# Patient Record
Sex: MALE | Race: WHITE | NOT HISPANIC OR LATINO | Employment: OTHER | URBAN - METROPOLITAN AREA
[De-identification: names, ages, dates, MRNs, and addresses within clinical notes are randomized per-mention and may not be internally consistent; named-entity substitution may affect disease eponyms.]

---

## 2022-08-04 ENCOUNTER — HOSPITAL ENCOUNTER (OUTPATIENT)
Dept: RADIOLOGY | Facility: HOSPITAL | Age: 62
Discharge: HOME | End: 2022-08-04
Attending: NURSE PRACTITIONER
Payer: COMMERCIAL

## 2022-08-04 DIAGNOSIS — J44.9 COPD (CHRONIC OBSTRUCTIVE PULMONARY DISEASE): ICD-10-CM

## 2022-08-04 PROCEDURE — 71046 X-RAY EXAM CHEST 2 VIEWS: CPT | Mod: 26,,, | Performed by: RADIOLOGY

## 2022-08-04 PROCEDURE — 71046 XR CHEST PA AND LATERAL: ICD-10-PCS | Mod: 26,,, | Performed by: RADIOLOGY

## 2022-08-04 PROCEDURE — 71046 X-RAY EXAM CHEST 2 VIEWS: CPT | Mod: TC

## 2022-08-27 ENCOUNTER — HOSPITAL ENCOUNTER (EMERGENCY)
Facility: HOSPITAL | Age: 62
Discharge: HOME | End: 2022-08-27
Attending: EMERGENCY MEDICINE
Payer: COMMERCIAL

## 2022-08-27 VITALS
HEIGHT: 69 IN | RESPIRATION RATE: 20 BRPM | SYSTOLIC BLOOD PRESSURE: 142 MMHG | WEIGHT: 175 LBS | TEMPERATURE: 98 F | BODY MASS INDEX: 25.92 KG/M2 | HEART RATE: 87 BPM | DIASTOLIC BLOOD PRESSURE: 107 MMHG | OXYGEN SATURATION: 98 %

## 2022-08-27 DIAGNOSIS — T81.30XA WOUND DEHISCENCE: Primary | ICD-10-CM

## 2022-08-27 PROCEDURE — 25000003 PHARM REV CODE 250: Performed by: EMERGENCY MEDICINE

## 2022-08-27 PROCEDURE — 12002 RPR S/N/AX/GEN/TRNK2.6-7.5CM: CPT

## 2022-08-27 PROCEDURE — 99284 EMERGENCY DEPT VISIT MOD MDM: CPT

## 2022-08-27 RX ORDER — LIDOCAINE HYDROCHLORIDE 10 MG/ML
5 INJECTION, SOLUTION EPIDURAL; INFILTRATION; INTRACAUDAL; PERINEURAL
Status: COMPLETED | OUTPATIENT
Start: 2022-08-27 | End: 2022-08-27

## 2022-08-27 RX ORDER — AMLODIPINE BESYLATE 10 MG/1
TABLET ORAL
COMMUNITY

## 2022-08-27 RX ADMIN — LIDOCAINE HYDROCHLORIDE 50 MG: 10 INJECTION, SOLUTION EPIDURAL; INFILTRATION; INTRACAUDAL; PERINEURAL at 09:08

## 2022-08-27 RX ADMIN — BACITRACIN ZINC, NEOMYCIN, POLYMYXIN B: 400; 3.5; 5 OINTMENT TOPICAL at 10:08

## 2022-08-27 NOTE — DISCHARGE INSTRUCTIONS
Apply antibiotic ointment and a sterile, loose bandage daily.  Follow-up with Dr. Casey on Monday.  Return here as needed or if worse in any way.

## 2022-08-27 NOTE — ED PROVIDER NOTES
Encounter Date: 8/27/2022       History     Chief Complaint   Patient presents with    Wound Check     Had surgery on the L wrist 10 days ago for tendonitis. States that he had sutures removed 3 days ago and that the wound has dehisced.      61-year-old male here for evaluation and treatment of surgical wound dehiscence.  Patient states that 10 days ago he had left wrist surgery by an orthopedist in [place].  Sutures were removed 3 days ago, and last night he noticed that 1 portion of the surgical incision had opened.  Denies any pain or other complications.  Does admit to some continued edema of the region since the surgery.  No fever or chills.  No purulent drainage.  Patient has not tried to contact the surgeon about this problem.    Review of patient's allergies indicates:   Allergen Reactions    Animal dander     Penicillins     Pollen extracts      Past Medical History:   Diagnosis Date    Anemia     Anxiety     Asthma     Cervical spondylosis 10/14/2016    Chronic back pain     COPD (chronic obstructive pulmonary disease)     Depression     GERD (gastroesophageal reflux disease)     History of prosthetic unicompartmental arthroplasty of right knee 02/24/2020    Hypertension 08/15/2016     Past Surgical History:   Procedure Laterality Date    ARTHROSCOPIC REPAIR OF ROTATOR CUFF OF SHOULDER Right 08/01/2019    COLONOSCOPY      HERNIA REPAIR      LUMBAR FUSION      REPAIR OF TRICEPS TENDON Right     3 surgeries    REPAIR OF TRICEPS TENDON Left     x1    ROTATOR CUFF REPAIR Bilateral     2 on right , 1 to left    UNICOMPARTMENTAL ARTHROPLASTY OF KNEE Right      Family History   Problem Relation Age of Onset    Hypertension Mother     Cancer Mother     Cancer Father     Hypertension Father      Social History     Tobacco Use    Smoking status: Never    Smokeless tobacco: Never   Substance Use Topics    Alcohol use: Not Currently    Drug use: Not Currently     Review of Systems   Constitutional: Negative.     HENT: Negative.     Eyes: Negative.    Respiratory: Negative.     Cardiovascular: Negative.    Gastrointestinal: Negative.    Endocrine: Negative.    Genitourinary: Negative.    Musculoskeletal:  Positive for arthralgias (Continued generalized left wrist soreness).   Skin:  Positive for wound.   Allergic/Immunologic: Negative.    Neurological: Negative.    Psychiatric/Behavioral: Negative.       Physical Exam     Initial Vitals [08/27/22 0927]   BP Pulse Resp Temp SpO2   (!) 142/107 87 20 98.4 °F (36.9 °C) 98 %      MAP       --         Physical Exam    Nursing note and vitals reviewed.  Constitutional: He appears well-developed and well-nourished. He is not diaphoretic. No distress.   HENT:   Head: Normocephalic and atraumatic.   Nose: Nose normal.   Mouth/Throat: Oropharynx is clear and moist.   Eyes: Conjunctivae and EOM are normal. Pupils are equal, round, and reactive to light. No scleral icterus.   Neck: Neck supple. No JVD present.   Normal range of motion.  Cardiovascular:  Normal rate, regular rhythm, normal heart sounds and intact distal pulses.           No murmur heard.  Pulmonary/Chest: Breath sounds normal. No stridor. No respiratory distress. He has no wheezes.   Abdominal: Abdomen is soft. Bowel sounds are normal. He exhibits no distension. There is no abdominal tenderness.   Musculoskeletal:         General: Tenderness and edema present. Normal range of motion.      Cervical back: Normal range of motion and neck supple.      Comments: On the dorsum of the left wrist/distal forearm, there is a Z-plasty scar, consistent with recent surgery.  The most proximal leg of the Z has dehisced.  Wound margins are clean.  No evidence of infection.  No underlying structures are seen or exposed.  The remainder of the surgical scar appears to be healing well and is intact.  There is generalized edema of the distal forearm/wrist/dorsum of the hand.  Normal sensory and motor function in the hand, range of motion  slightly decreased secondary to edema in discomfort, capillary refill normal.     Neurological: He is alert and oriented to person, place, and time. He has normal strength. No cranial nerve deficit or sensory deficit. GCS score is 15. GCS eye subscore is 4. GCS verbal subscore is 5. GCS motor subscore is 6.   Skin: Skin is warm and dry. Capillary refill takes less than 2 seconds. No rash noted. No erythema.   Psychiatric: He has a normal mood and affect. His behavior is normal.       ED Course   Lac Repair    Date/Time: 8/27/2022 10:33 AM  Performed by: Edvin Nova MD  Authorized by: Edvin Nova MD     Consent:     Consent obtained:  Verbal    Consent given by:  Patient    Risks, benefits, and alternatives were discussed: yes      Risks discussed:  Infection, need for additional repair, nerve damage, poor wound healing, poor cosmetic result, pain, retained foreign body, tendon damage and vascular damage    Alternatives discussed:  No treatment, referral and delayed treatment  Universal protocol:     Procedure explained and questions answered to patient or proxy's satisfaction: yes      Relevant documents present and verified: yes      Test results available: yes      Imaging studies available: yes      Required blood products, implants, devices, and special equipment available: yes      Site/side marked: yes      Immediately prior to procedure, a time out was called: yes      Patient identity confirmed:  Verbally with patient and arm band  Anesthesia:     Anesthesia method:  Local infiltration    Local anesthetic:  Lidocaine 1% w/o epi  Laceration details:     Location:  Shoulder/arm    Shoulder/arm location:  L lower arm    Length (cm):  3    Depth (mm):  3  Pre-procedure details:     Preparation:  Patient was prepped and draped in usual sterile fashion  Exploration:     Limited defect created (wound extended): no      Hemostasis achieved with:  Direct pressure    Imaging outcome: foreign body not noted       Wound exploration: wound explored through full range of motion and entire depth of wound visualized      Wound extent: no areolar tissue violation noted, no fascia violation noted, no foreign bodies/material noted, no muscle damage noted, no nerve damage noted, no tendon damage noted, no underlying fracture noted and no vascular damage noted      Contaminated: no    Treatment:     Area cleansed with:  Povidone-iodine and saline    Amount of cleaning:  Standard    Irrigation solution:  Sterile saline    Irrigation volume:  50    Irrigation method:  Syringe    Foreign body removal: none seen.      Debridement:  None    Undermining:  None    Scar revision: no    Skin repair:     Repair method:  Sutures and Steri-Strips    Suture size:  4-0    Suture material:  Nylon    Suture technique:  Simple interrupted    Number of sutures:  6  Approximation:     Approximation:  Close  Repair type:     Repair type:  Simple  Post-procedure details:     Dressing:  Antibiotic ointment    Procedure completion:  Tolerated well, no immediate complications  Labs Reviewed - No data to display       Imaging Results    None          Medications   LIDOcaine (PF) 10 mg/ml (1%) injection 50 mg (50 mg Infiltration Given by Provider 8/27/22 8214)   neomycin-bacitracin-polymyxin ointment ( Topical (Top) Given 8/27/22 6608)     Medical Decision Making:   Differential Diagnosis:   Wound dehiscence, infection, etc.  ED Management:  After discussing options with the patient, it was decided that the wound would be closed with sutures.  There is no evidence of infectious process and the wound is very clean.  Sutures were used to close the dehisced portion of the wound, and Steri-Strips were placed over the remaining portion of the surgical incision to prevent any further dehiscence.  Patient was given wound care instructions and he will follow-up with his surgeon on Monday morning.  Return here for any worsening signs or symptoms.                     Clinical Impression:   Final diagnoses:  [T81.30XA] Wound dehiscence (Primary)      ED Disposition Condition    Discharge Stable          ED Prescriptions    None       Follow-up Information       Follow up With Specialties Details Why Contact Info    Temo Casey MD Orthopedic Surgery In 2 days  [address] [facility] [address]  [phone]      [facility] Emergency Dept Emergency Medicine  As needed, If symptoms worsen [address] [address] [phone]             Edvin Nova MD  08/27/22 1039

## 2024-06-17 ENCOUNTER — TELEPHONE (OUTPATIENT)
Dept: NEUROSURGERY | Facility: CLINIC | Age: 64
End: 2024-06-17
Payer: COMMERCIAL

## 2024-06-17 NOTE — TELEPHONE ENCOUNTER
LVM to return call to clinic for scheduling.  Has current imaging uploaded to chart.  Will need a copy of dictated reports.      ----- Message from Tuan Cazares sent at 6/17/2024 10:52 AM CDT -----  Regarding: Sooner Appt  Type:  Sooner Appointment Request    Caller is requesting a sooner appointment.  Caller declined first available appointment listed below.  Caller will not accept being placed on the waitlist and is requesting a message be sent to doctor.    Name of Caller:Pt    When is the first available appointment?bookit didn't list symptoms    Symptoms:     Would the patient rather a call back or a response via [facility]? Call back    Best Call Back Number:[phone]      Additional Information: Sts he already dropped off films and were uploaded needs to get appt booked.   Please advise -- Thank you

## 2024-06-18 ENCOUNTER — TELEPHONE (OUTPATIENT)
Dept: NEUROSURGERY | Facility: CLINIC | Age: 64
End: 2024-06-18
Payer: COMMERCIAL

## 2024-06-18 NOTE — TELEPHONE ENCOUNTER
Returned patient's phone call.  Patient successfully scheduled with Dr. Troncoso 07/10/24.  Patient will obtain dictated reports and have them faxed to our clinic.  Advised to contact clinic with any questions/concerns.      ----- Message from Paulina Wilhelm Patient Care Assistant sent at 6/18/2024  8:27 AM CDT -----  Contact: Pt  Type: Return Call    Who Called: Pt  Who Left Message for Pt: Patricia  Does the patient know what this is regarding: Yes  Best Call Back Number: [phone]  Thank you~

## 2024-07-10 ENCOUNTER — PATIENT MESSAGE (OUTPATIENT)
Dept: NEUROSURGERY | Facility: CLINIC | Age: 64
End: 2024-07-10
Payer: COMMERCIAL

## 2024-07-10 ENCOUNTER — OFFICE VISIT (OUTPATIENT)
Dept: NEUROSURGERY | Facility: CLINIC | Age: 64
End: 2024-07-10
Payer: COMMERCIAL

## 2024-07-10 ENCOUNTER — TELEPHONE (OUTPATIENT)
Dept: NEUROSURGERY | Facility: CLINIC | Age: 64
End: 2024-07-10
Payer: COMMERCIAL

## 2024-07-10 VITALS
SYSTOLIC BLOOD PRESSURE: 121 MMHG | DIASTOLIC BLOOD PRESSURE: 77 MMHG | BODY MASS INDEX: 25.93 KG/M2 | HEART RATE: 70 BPM | HEIGHT: 69 IN | RESPIRATION RATE: 18 BRPM | WEIGHT: 175.06 LBS

## 2024-07-10 DIAGNOSIS — M54.9 DORSALGIA, UNSPECIFIED: Primary | ICD-10-CM

## 2024-07-10 PROCEDURE — 99205 OFFICE O/P NEW HI 60 MIN: CPT | Mod: S$GLB,,, | Performed by: STUDENT IN AN ORGANIZED HEALTH CARE EDUCATION/TRAINING PROGRAM

## 2024-07-10 RX ORDER — CHLORTHALIDONE 25 MG/1
1 TABLET ORAL EVERY MORNING
COMMUNITY
Start: 2023-08-29

## 2024-07-10 NOTE — PROGRESS NOTES
[facility] Neurosurgery - [place]  Clinic Consult     Consult Requested By: No, Primary Doctor, Self, Aaareferral        SUBJECTIVE:     Chief Complaint:   Chief Complaint   Patient presents with    Back Pain     Patient present to clinic with c/o intermittent back pain, worse in the last month.  Reports BL foot drop. Reports balance issues. Denies any incontinence.        History of Present Illness:  Orlin Mayo is a 63 y.o. male who presents with a very complicated history he has had multiple spine surgeries including a remote ACDF with a adjacent segment disease slight kyphosis and a spondylolisthesis with moderate stenosis, he has no cord compression or cord signal change, he denies any new upper extremity symptoms.  Functionally stable.  He will get some intermittent paresthesias in the right hand but this is chronic nothing new.    With his regard to his lumbar spine in [place] in April he had a revision surgery and which sounds like an extension of fusion proximally and distally which would now be a fusion from L2-S1 this included an jail at L5-S1.  He states that he had the surgery for back but right leg pain and paresthesias involving the right foot.  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  He really does not feel like the surgery improved his symptoms.  But no worse  I have no postoperative imaging.    I he did have known proximal mild moderate scoliosis, as well as extensive thoracic degeneration with stenosis what appears to be some myelomalacia.  He states this was known preoperatively he saw 2 surgical opinions had surgery in [place] proceeded with the lumbar surgery.  He has no rapid deterioration but he does have chronic thoracic myelopathy with some imbalance he does feel like this limits his walking and coordination    And lastly 3 weeks ago he underwent a knee replacement with Dr. Acosta which he is actively recovering with no complications, and with  physical therapy.          Pertinent and Recent history, provider evaluations, imaging and data reviewed in EPIC             Diagnostic Results:  I have independently reviewed the following imaging:    Reviewed a preoperative lumbar MRI from January which showed a slight low-grade spondylolisthesis at L5-S1 previous fusion from 3-5 and adjacent segment disease with ligamentum buckling and stenosis at L2-3.  Proximal to this a mild moderate including rotational scoliosis    He has a thoracic MRI from March of 2024  Extensive multilevel disc degeneration disc osteophyte complex in the lower segment from T9-T12 multifocal stenosis of the lowest segment with myelomalacia  Chronic appearing    With regard to his cervical spine see 4-6 ACDF disc osteophyte complex with moderate stenosis and slight kyphosis above without cord compression or cord signal change, and at the cervicothoracic junction a slight spondylolisthesis ligamentum buckling circumferential stenosis without cord deformation or cord signal change            Review of patient's allergies indicates:   Allergen Reactions    Animal dander     Penicillins     Pollen extracts        Past Medical History:   Diagnosis Date    Anemia     Anxiety     Asthma     Cervical spondylosis 10/14/2016    Chronic back pain     COPD (chronic obstructive pulmonary disease)     Depression     GERD (gastroesophageal reflux disease)     History of prosthetic unicompartmental arthroplasty of right knee 02/24/2020    Hypertension 08/15/2016     Past Surgical History:   Procedure Laterality Date    ARTHROSCOPIC REPAIR OF ROTATOR CUFF OF SHOULDER Right 08/01/2019    COLONOSCOPY      HERNIA REPAIR      LUMBAR FUSION      REPAIR OF TRICEPS TENDON Right     3 surgeries    REPAIR OF TRICEPS TENDON Left     x1    ROTATOR CUFF REPAIR Bilateral     2 on right , 1 to left    UNICOMPARTMENTAL ARTHROPLASTY OF KNEE Right      Family History   Problem Relation Name Age of Onset    Hypertension  Mother      Cancer Mother      Cancer Father      Hypertension Father       Social History     Tobacco Use    Smoking status: Never    Smokeless tobacco: Never   Substance Use Topics    Alcohol use: Not Currently    Drug use: Not Currently        Review of Systems:      Constitutional: no fever, chills or night sweats. No abrupt changes in weight         OBJECTIVE:     Vital Signs (Most Recent):  Pulse: 70 (07/10/24 1004)  Resp: 18 (07/10/24 1004)  BP: 121/77 (07/10/24 1004)    Physical Exam:      General: well developed, well nourished, no distress. .  Mental Status: Awake, Alert, Oriented x 4  Language: No aphasia  Speech: No dysarthria  Head: normocephalic, atraumatic.  Neck: trachea midline, no JVD   Cardiovascular: no LE edema  Pulmonary: normal respirations, no signs of respiratory distress  Abdomen: soft, non-distended    Motor Strength:  No abnormal movements seen.     Strength  Deltoids Triceps Biceps Wrist Extension Wrist Flexion Hand  Interossei     Upper: R 5/5 5/5 5/5 5/5 5/5 5/5 5/5      L 5/5 5/5 5/5 5/5 5/5 5/5 5/5       Iliopsoas Quadriceps  Knee replacement Knee  Flexion Tibialis  anterior Gastro- cnemius EHL  Foot Eversion Foot inversion   Lower: R 5/5 4/5 5/5 3-/5 5/5 5/5 5/5 5/5 4/5    L 5/5 5/5 5/5 2/5 5/5 5/5 5/5 5/5 2/5     SILT,PP dec rigtht LE to foot diffuse      DTR's: 3 + LE  Johnson: absent  Clonus: absent    Gait: independent with cane assist ( currently recovering from knee replacement   Tandem Gait: No difficulty           Able to walk on heels & toes             ASSESSMENT/PLAN:     Dorsalgia, unspecified  -     MRI Lumbar Spine Without Contrast; Future; Expected date: 07/10/2024  -     X-Ray Lumbar Spine Ap Lateral w/Flex Ext; Future; Expected date: 07/10/2024  -     X-Ray Scoliosis Complete; Future; Expected date: 07/10/2024      63-year-old gentleman complicated history in summary  He has a remote ACDF, with slight proximal kyphosis proximal and distal adjacent segment  disease with moderate stenosis no cord signal change and no progressive myeloradiculopathy.  We will continue to monitor this he was well educated    2. He underwent a proximal and distal revision spine surgery in [place] in April for what he states is primarily right lower extremity pain and paresthesias which did not improve.  I do not have any postoperative imaging preoperatively he had adjacent segment disease and stenosis at L2-3 he states he underwent a TLIF at L5-S1 and potentially at L2-3 as he states he does not recall posterior surgery at the time.  This is complicated with thoracic stenosis, chronic thoracic myelopathy without progression which was known preoperatively with a thoracic MRI.  Reviewed the complexity of this situation.  Overall he is stable and an added factors that he underwent a knee replacement 3 weeks ago from which he is actively recovering.    I have recommended to recover from his knee we will obtain an MRI of his lumbar spine to evaluate his postoperative features as well as a scoliosis x-ray  I do not think there is any urgency but with regard to progressive thoracic myelopathy a complicated cyst situation with scoliosis and multiple previous lumbar fusions  With regard to his long-term management and surgical treatment options I have recommended obtaining a new imaging.  I am going to refer him to Dr. Carlton  with complex spine for opinion                              Stuart Troncoso MD  Neurosurgery

## 2024-07-11 ENCOUNTER — TELEPHONE (OUTPATIENT)
Dept: NEUROSURGERY | Facility: CLINIC | Age: 64
End: 2024-07-11
Payer: COMMERCIAL

## 2024-07-11 NOTE — TELEPHONE ENCOUNTER
P/c to pt. Gave him all the info on below appts:    Future Appointments   Date Time Provider Department Center   7/17/2024  1:30 PM [facility] OPP MRI1 [facility] OPP MRI [facility] - [facility]PP   7/17/2024  2:30 PM [facility] OPP XR2 [facility] OP XRAY [facility] - [facility]   8/22/2024  9:30 AM [facility] OIC-CT1 500 LB LIMIT [facility]   8/22/2024  9:45 AM [facility] OIC-XRAY [facility] XRAY [facility]   8/22/2024 10:15 AM [facility]IC-XRAY [facility]   8/22/2024 11:00 AM Dawit Carlton MD [facility] NEUROS8 [facility]     Pt. Thanked me for notifying him.

## 2024-07-11 NOTE — TELEPHONE ENCOUNTER
Pc earlier this am regarding the below appts. Pt. Thanked me for calling    Future Appointments   Date Time Provider Department Center   7/17/2024  1:30 PM [facility] OPP MRI1 [facility] OPP MRI [facility] - OPP   7/17/2024  2:30 PM [facility] OPP XR2 [facility] OP XRAY [facility] - OPP   8/22/2024  9:30 AM [facility] OIC-CT1 500 LB LIMIT [facility]   8/22/2024  9:45 AM [facility]XRAY [facility]   8/22/2024 10:15 AM [facility]XRAY [facility]   8/22/2024 11:00 AM Dawit Carlton MD [facility] NEUROS8 Jose chantel

## 2024-07-18 ENCOUNTER — PATIENT MESSAGE (OUTPATIENT)
Dept: NEUROSURGERY | Facility: CLINIC | Age: 64
End: 2024-07-18
Payer: COMMERCIAL

## 2024-08-05 ENCOUNTER — OFFICE VISIT (OUTPATIENT)
Dept: PODIATRY | Facility: CLINIC | Age: 64
End: 2024-08-05
Payer: COMMERCIAL

## 2024-08-05 VITALS — BODY MASS INDEX: 25.93 KG/M2 | HEIGHT: 69 IN | WEIGHT: 175.06 LBS

## 2024-08-05 DIAGNOSIS — M79.2 NERVE PAIN: ICD-10-CM

## 2024-08-05 DIAGNOSIS — M77.41 METATARSALGIA OF BOTH FEET: Primary | ICD-10-CM

## 2024-08-05 DIAGNOSIS — M77.42 METATARSALGIA OF BOTH FEET: Primary | ICD-10-CM

## 2024-08-05 PROCEDURE — 99999 PR PBB SHADOW E&M-EST. PATIENT-LVL III: CPT | Mod: PBBFAC,,, | Performed by: STUDENT IN AN ORGANIZED HEALTH CARE EDUCATION/TRAINING PROGRAM

## 2024-08-05 PROCEDURE — 99204 OFFICE O/P NEW MOD 45 MIN: CPT | Mod: S$GLB,,, | Performed by: STUDENT IN AN ORGANIZED HEALTH CARE EDUCATION/TRAINING PROGRAM

## 2024-08-05 RX ORDER — LIDOCAINE AND PRILOCAINE 25; 25 MG/G; MG/G
CREAM TOPICAL
Qty: 60 G | Refills: 11 | Status: SHIPPED | OUTPATIENT
Start: 2024-08-05 | End: 2025-08-05

## 2024-08-05 RX ORDER — ALCOHOL 2.38 KG/3.79L
1 GEL TOPICAL DAILY
Qty: 30 CAPSULE | Refills: 11 | Status: SHIPPED | OUTPATIENT
Start: 2024-08-05 | End: 2025-08-05

## 2024-08-09 ENCOUNTER — TELEPHONE (OUTPATIENT)
Dept: NEUROSURGERY | Facility: CLINIC | Age: 64
End: 2024-08-09
Payer: COMMERCIAL

## 2024-08-20 ENCOUNTER — HOSPITAL ENCOUNTER (OUTPATIENT)
Dept: RADIOLOGY | Facility: HOSPITAL | Age: 64
Discharge: HOME | End: 2024-08-20
Attending: NEUROLOGICAL SURGERY
Payer: COMMERCIAL

## 2024-08-20 ENCOUNTER — OFFICE VISIT (OUTPATIENT)
Dept: NEUROSURGERY | Facility: CLINIC | Age: 64
End: 2024-08-20
Payer: COMMERCIAL

## 2024-08-20 VITALS — SYSTOLIC BLOOD PRESSURE: 117 MMHG | DIASTOLIC BLOOD PRESSURE: 76 MMHG | HEART RATE: 90 BPM

## 2024-08-20 DIAGNOSIS — M54.9 DORSALGIA, UNSPECIFIED: ICD-10-CM

## 2024-08-20 DIAGNOSIS — M47.14 THORACIC MYELOPATHY: Primary | ICD-10-CM

## 2024-08-20 PROCEDURE — 72125 CT NECK SPINE W/O DYE: CPT | Mod: 26,,, | Performed by: RADIOLOGY

## 2024-08-20 PROCEDURE — 72125 CT NECK SPINE W/O DYE: CPT | Mod: TC

## 2024-08-20 PROCEDURE — 99999 PR PBB SHADOW E&M-EST. PATIENT-LVL III: CPT | Mod: PBBFAC,,, | Performed by: NEUROLOGICAL SURGERY

## 2024-08-20 PROCEDURE — 99215 OFFICE O/P EST HI 40 MIN: CPT | Mod: S$GLB,,, | Performed by: NEUROLOGICAL SURGERY

## 2024-08-20 PROCEDURE — 72082 X-RAY EXAM ENTIRE SPI 2/3 VW: CPT | Mod: TC

## 2024-08-20 NOTE — PROGRESS NOTES
Dear Stuart Troncoso MD,    Thank you for referring this patient to my clinic. The full details of my evaluation will also be forthcoming to you by letter.    CHIEF COMPLAINT:  Low back pain    I, Candice Woods, attest that this documentation has been prepared under the direction and in the presence of Dawit Carlton MD.    HPI from 7/10/2024 (Dr. Staurt Troncoso):  Orlin Mayo is a 63 y.o. male who presents with a very complicated history he has had multiple spine surgeries including a remote ACDF with a adjacent segment disease slight kyphosis and a spondylolisthesis with moderate stenosis, he has no cord compression or cord signal change, he denies any new upper extremity symptoms.  Functionally stable.  He will get some intermittent paresthesias in the right hand but this is chronic nothing new.     With his regard to his lumbar spine in [place] in April he had a revision surgery and which sounds like an extension of fusion proximally and distally which would now be a fusion from L2-S1 this included an intermediate at L5-S1.  He states that he had the surgery for back but right leg pain and paresthesias involving the right foot.  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  He really does not feel like the surgery improved his symptoms.  But no worse  I have no postoperative imaging.     I he did have known proximal mild moderate scoliosis, as well as extensive thoracic degeneration with stenosis what appears to be some myelomalacia.  He states this was known preoperatively he saw 2 surgical opinions had surgery in [place] proceeded with the lumbar surgery.  He has no rapid deterioration but he does have chronic thoracic myelopathy with some imbalance he does feel like this limits his walking and coordination     And lastly 3 weeks ago he underwent a knee replacement with Dr. Acosta which he is actively recovering with no complications, and with physical therapy.    Interval Hx:   Today the patient reports  that his main concern is low back pain and burning and paraesthesias in his feet, with the right side worse than left. He states that the back pain gets worse as the day goes on. Until recently, his foot symptoms were more concerning but now his lower back pain seems to be present more frequently. He says he is having increased balance issues. He states that he has increased urinary frequency over the past few months. He also reports dropping things with his hands and some occasional numbness in his right hand. He has a back brace that he wears occasionally.     Patient denies any other complaints at this time.    Review of patient's allergies indicates:   Allergen Reactions    Animal dander     Penicillins     Pollen extracts        Past Medical History:   Diagnosis Date    Anemia     Anxiety     Asthma     Cervical spondylosis 10/14/2016    Chronic back pain     COPD (chronic obstructive pulmonary disease)     Depression     GERD (gastroesophageal reflux disease)     History of prosthetic unicompartmental arthroplasty of right knee 02/24/2020    Hypertension 08/15/2016     Past Surgical History:   Procedure Laterality Date    ARTHROSCOPIC REPAIR OF ROTATOR CUFF OF SHOULDER Right 08/01/2019    COLONOSCOPY      HERNIA REPAIR      LUMBAR FUSION      REPAIR OF TRICEPS TENDON Right     3 surgeries    REPAIR OF TRICEPS TENDON Left     x1    ROTATOR CUFF REPAIR Bilateral     2 on right , 1 to left    UNICOMPARTMENTAL ARTHROPLASTY OF KNEE Right      Family History   Problem Relation Name Age of Onset    Hypertension Mother      Cancer Mother      Cancer Father      Hypertension Father       Social History     Tobacco Use    Smoking status: Never    Smokeless tobacco: Never   Substance Use Topics    Alcohol use: Not Currently    Drug use: Not Currently        Review of Systems   Genitourinary:  Positive for frequency.   Musculoskeletal:  Positive for back pain and gait problem.   Neurological:  Positive for numbness.         Paraesthesias. Balance issues.   All other systems reviewed and are negative.      OBJECTIVE:   Vital Signs:       Physical Exam:    Vital signs: All nursing notes and vital signs reviewed -- afebrile, vital signs stable.  Constitutional: Patient sitting comfortably in chair. Appears well developed and well nourished.  Skin: Exposed areas are intact without abnormal markings, rashes or other lesions. Well-healed abdominal incision and two well-healed paraspinal incisions.   HEENT: Normocephalic. Normal conjunctivae.  Cardiovascular: Normal rate and regular rhythm.  Respiratory: Chest wall rises and falls symmetrically, without signs of respiratory distress.  Abdomen: Soft and non-tender.  Extremities: Warm and without edema. Calves supple, non-tender.  Psych/Behavior: Normal affect.    Neurological:    Mental status: Alert and oriented. Conversational and appropriate.       Cranial Nerves: VFF to confrontation. PERRL. EOMI without nystagmus. Facial STLT normal and symmetric. Strong, symmetric muscles of mastication. Facial strength full and symmetric. Hearing equal bilaterally to finger rub. Palate and uvula rise and fall normally in midline. Shoulder shrug 5/5 strength. Tongue midline.     Motor:    Upper:  Deltoids Triceps Biceps WE WF     R 5/5 5/5 5/5 5/5 5/5 5/5    L 5/5 5/5 5/5 5/5 5/5 5/5      Lower:  HF KE KF DF PF EHL    R 5/5 5/5 5/5 3/5 3/5 3/5    L 5/5 5/5 5/5 2/5 3/5 3/5     Bilateral quadriceps atrophy, right more than left.     Sensory: Intact sensation to light touch in all extremities. Romberg positive.    Reflexes:          DTR: 2+ symmetrically throughout.     Johnson's: Negative.     Babinski's: Negative.     Clonus: Negative.     2+ left knee reflex. Had recent knee replacement on right side.     Cerebellar: Finger-to-nose and rapid alternating movements normal. Spastic gait.     Spine:    Posture: Head well aligned over pelvis in front and side views.  No focal or global spinal deformity  visible on inspection. Shoulders and hips even. No obvious leg length discrepancy. No scapula winging.    Bending: Full ROM with forward, back and lateral bending. No rib prominence with forward bend.    Cervical:      ROM: Full with flexion, extension, lateral rotation and ear-to-shoulder bend.      Midline TTP: Negative.     Spurling's test: Negative.     Lhermitte's: Negative.    Thoracic:     Midline TTP: Negative    Lumbar:     Midline TTP: Negative     Straight Leg Test: Negative     Crossed Straight Leg Test: Negative     Sciatic notch tenderness: Negative.    Other:     SI joint TTP: Negative.     Greater trochanter TTP: Negative.     Tenderness with external/internal hip rotation: Negative.    Diagnostic Results:  All imaging was independently reviewed by me.    MRI lumbar spine, dated 7/17/2024:  1. No severe stenosis.     MRI cervical spine, dated 3/1/2024:  1. No severe stenosis.   2. Anterior subluxation of C7 on T1 with some bilateral foraminal stenosis.   3. Moderate bilateral foraminal stenosis at C5-6.     MRI thoracic spine, dated 3/1/2024:   1. Severe stenosis at T8-9 with subtle T2 signal change.    2. Severe stenosis at T10-11 with T2 signal change.   3. Stenosis at both levels from facet arthropathy.     CT cervical spine, dated 8/20/2024:  1. Solid fusion at C4-5.     Flex/Ex X-ray cervical spine, dated 8/20/2024:  1. No dynamic instability.   2. Kyphosis at a fixed C4-5 segment.     Flex/Ex X-ray lumbar spine, dated 7/17/2024:  No dynamic instability.     Scoliosis standing AP and Lateral X-ray, dated 8/20/2024:  1. Spinal hardware from L2-S1 including an L2-3 and L5-S1 ALIF.   2. 18 degrees of proximal junctional scoliosis.   3. No global coronal deformity.   4. No significant sagittal imbalance or spinal pelvic mismatch.     ASSESSMENT/PLAN:     Orlin Mayo has progressive thoracic myelopathy marked by gait spasticity and ataxia, increased urinary frequency, BLE weakness, and BLE  dysesthesias. 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 I have recommended a T8-11 laminectomy, facetectomy and posterior instrumented fusion. R/B/A/I/M were reviewed in detail and he wished to proceed. I highlighted the possible risk of progression of his moderate proximal junctional scoliosis above his L2-S1 fusion and the possible future need to extend this construct to the distal hardware. He still wished to proceed    The patient understands and agrees with the plan of care. All questions were answered.     1. T8 to T11 laminectomy and instrumented fusion    I, Dr. Dawit Carlton personally performed the services described in this documentation. All medical record entries made by the scribe, Candice Woods, were at my direction and in my presence. I have reviewed the chart and agree that the record reflects my personal performance and is accurate and complete.      Dawit Carlton M.D.  Department of Neurosurgery  [facility]

## 2024-08-22 ENCOUNTER — TELEPHONE (OUTPATIENT)
Dept: NEUROSURGERY | Facility: CLINIC | Age: 64
End: 2024-08-22
Payer: COMMERCIAL

## 2024-08-22 DIAGNOSIS — M47.14 SPONDYLOSIS WITH MYELOPATHY, THORACIC REGION: Primary | ICD-10-CM

## 2024-08-23 ENCOUNTER — TELEPHONE (OUTPATIENT)
Dept: NEUROSURGERY | Facility: CLINIC | Age: 64
End: 2024-08-23
Payer: COMMERCIAL

## 2024-08-23 ENCOUNTER — PATIENT MESSAGE (OUTPATIENT)
Dept: NEUROSURGERY | Facility: CLINIC | Age: 64
End: 2024-08-23
Payer: COMMERCIAL

## 2024-08-23 DIAGNOSIS — Z98.1 S/P LAMINECTOMY WITH SPINAL FUSION: Primary | ICD-10-CM

## 2024-09-24 DIAGNOSIS — Z01.818 PREOPERATIVE TESTING: Primary | ICD-10-CM

## 2024-09-24 RX ORDER — TESTOSTERONE GEL, 1% 10 MG/G
GEL TRANSDERMAL DAILY
COMMUNITY

## 2024-09-24 RX ORDER — METHOCARBAMOL 500 MG/1
500 TABLET, FILM COATED ORAL DAILY PRN
Status: ON HOLD | COMMUNITY
End: 2024-11-01 | Stop reason: HOSPADM

## 2024-09-24 NOTE — ANESTHESIA PAT ROS NOTE
09/24/2024  Orlin Mayo is a 63 y.o., male.      Pre-op Assessment          Review of Systems           Anesthesia Assessment: Preoperative EQUATION    Planned Procedure: Procedure(s) (LRB):  T8-T11 Laminectomy and posterior instrumented fusion (N/A)  Requested Anesthesia Type:General  Surgeon: Dawit Carlton MD  Service: Neurosurgery  Known or anticipated Date of Surgery:10/30/2024    Surgeon notes: reviewed    Electronic QUestionnaire Assessment completed via nurse interview with patient.        Triage considerations:       Previous anesthesia records:Not available and Hx PONV( PER PATIENT PONV ONLY AFTER ONE SURGERY- ROTATOR CUFF REPAIR)    Last PCP note:  NONE  Subspecialty notes: ENT, Neurosurgery, PODIATRY    Other important co-morbidities: PER EPIC: COPD, GERD, HTN, and THORACIC SPONDYLOSIS, ANEMIA, ASTHMA, OSTEOARTHRITIS       Tests already available:  Available tests,  within 3 months , within [facility] .   8/20/2024 XRAY SCOLIOSIS COMPLETE INCLUDING S&E          Instructions given. (See in Nurse's note)    Optimization:  Anesthesia Preop Clinic Assessment  Indicated    Medical Opinion Indicated           Plan:    Testing:  BMP, CBC, EKG, PT/INR, PTT, T&S, and UA   Pre-anesthesia  visit       Visit focus: concerns in complex and/or prolonged anesthesia     Consultation:IM Perioperative Hospitalist OR NP     Patient  has previously scheduled Medical Appointment:NONE    Navigation: Tests Scheduled. TBD             Consults scheduled.TBD             Results will be tracked by Preop Clinic.  10/17 Labs, UA and EKG resulted and noted by Dr. Tomas Lawrence.   10/18 Medical optimization by Mynor Modi NP on 10/17.  Halle Saez RN BSN

## 2024-09-24 NOTE — PRE-PROCEDURE INSTRUCTIONS
Patient stated has only  had PONV with one surgery, rotator cuff repair. No other problem with anesthesia in the past. Will need medical clearance from your PCP. He does not have a PCP. Will need NP/ poc appt, labs, ua, and EKG. He is not sure if has a preop testing form from Dr. Carlton. Instructed to bring to NP appt if he has it. Our  will call to set up these appts.     Preop instructions given. Hold aspirin, aspirin containing products, nsaids( Aleve, Advil, Motrin, Ibuprofen, Naprosyn, Naproxen, Voltaren, Diclofenac, Mobic, Meloxicam, Celebrex, Celecoxib), vitamins ( Metanx, algal oil) and supplements one week prior to surgery.     May take Tylenol.( Also sent to [facility] portal)  Verbalizes understanding.

## 2024-09-25 ENCOUNTER — TELEPHONE (OUTPATIENT)
Dept: PREADMISSION TESTING | Facility: HOSPITAL | Age: 64
End: 2024-09-25

## 2024-10-01 ENCOUNTER — PATIENT MESSAGE (OUTPATIENT)
Dept: NEUROSURGERY | Facility: CLINIC | Age: 64
End: 2024-10-01
Payer: COMMERCIAL

## 2024-10-15 ENCOUNTER — TELEPHONE (OUTPATIENT)
Dept: INTERNAL MEDICINE | Facility: CLINIC | Age: 64
End: 2024-10-15
Payer: MEDICARE

## 2024-10-16 ENCOUNTER — PATIENT MESSAGE (OUTPATIENT)
Dept: NEUROSURGERY | Facility: CLINIC | Age: 64
End: 2024-10-16
Payer: MEDICARE

## 2024-10-16 ENCOUNTER — TELEPHONE (OUTPATIENT)
Dept: NEUROSURGERY | Facility: CLINIC | Age: 64
End: 2024-10-16
Payer: MEDICARE

## 2024-10-16 ENCOUNTER — HOSPITAL ENCOUNTER (OUTPATIENT)
Dept: CARDIOLOGY | Facility: CLINIC | Age: 64
Discharge: HOME | End: 2024-10-16
Payer: MEDICARE

## 2024-10-16 ENCOUNTER — OFFICE VISIT (OUTPATIENT)
Dept: INTERNAL MEDICINE | Facility: CLINIC | Age: 64
End: 2024-10-16
Payer: COMMERCIAL

## 2024-10-16 VITALS
DIASTOLIC BLOOD PRESSURE: 89 MMHG | OXYGEN SATURATION: 96 % | BODY MASS INDEX: 24.22 KG/M2 | TEMPERATURE: 99 F | SYSTOLIC BLOOD PRESSURE: 140 MMHG | WEIGHT: 159.81 LBS | HEIGHT: 68 IN | HEART RATE: 70 BPM

## 2024-10-16 DIAGNOSIS — R09.89 RIGHT CAROTID BRUIT: ICD-10-CM

## 2024-10-16 DIAGNOSIS — K21.9 GASTROESOPHAGEAL REFLUX DISEASE, UNSPECIFIED WHETHER ESOPHAGITIS PRESENT: ICD-10-CM

## 2024-10-16 DIAGNOSIS — I10 HYPERTENSION, UNSPECIFIED TYPE: ICD-10-CM

## 2024-10-16 DIAGNOSIS — M21.372 FOOT DROP, BILATERAL: Primary | ICD-10-CM

## 2024-10-16 DIAGNOSIS — J45.909 ASTHMA, UNSPECIFIED ASTHMA SEVERITY, UNSPECIFIED WHETHER COMPLICATED, UNSPECIFIED WHETHER PERSISTENT: ICD-10-CM

## 2024-10-16 DIAGNOSIS — E78.00 HYPERCHOLESTEREMIA: ICD-10-CM

## 2024-10-16 DIAGNOSIS — F12.90 MARIJUANA SMOKER: ICD-10-CM

## 2024-10-16 DIAGNOSIS — Z01.818 PREOPERATIVE TESTING: ICD-10-CM

## 2024-10-16 DIAGNOSIS — R09.89 BRUIT OF RIGHT CAROTID ARTERY: ICD-10-CM

## 2024-10-16 DIAGNOSIS — F19.20 POLYSUBSTANCE DEPENDENCE: ICD-10-CM

## 2024-10-16 DIAGNOSIS — D64.9 ANEMIA, UNSPECIFIED TYPE: ICD-10-CM

## 2024-10-16 DIAGNOSIS — F32.A DEPRESSION, UNSPECIFIED DEPRESSION TYPE: ICD-10-CM

## 2024-10-16 DIAGNOSIS — M21.371 FOOT DROP, BILATERAL: Primary | ICD-10-CM

## 2024-10-16 PROBLEM — R13.12 OROPHARYNGEAL DYSPHAGIA: Status: ACTIVE | Noted: 2023-09-12

## 2024-10-16 PROBLEM — J45.20 MILD INTERMITTENT ASTHMA, UNCOMPLICATED: Status: RESOLVED | Noted: 2018-07-19 | Resolved: 2024-10-16

## 2024-10-16 PROBLEM — J45.901 ACUTE ASTHMA EXACERBATION: Status: RESOLVED | Noted: 2021-02-05 | Resolved: 2024-10-16

## 2024-10-16 PROBLEM — J45.20 MILD INTERMITTENT ASTHMA, UNCOMPLICATED: Status: ACTIVE | Noted: 2018-07-19

## 2024-10-16 PROBLEM — G60.9 IDIOPATHIC PERIPHERAL NEUROPATHY: Status: ACTIVE | Noted: 2023-11-03

## 2024-10-16 PROBLEM — Z96.651: Status: ACTIVE | Noted: 2020-02-23

## 2024-10-16 PROBLEM — J45.901 ACUTE ASTHMA EXACERBATION: Status: ACTIVE | Noted: 2021-02-05

## 2024-10-16 PROBLEM — E29.1 HYPOGONADISM IN MALE: Status: ACTIVE | Noted: 2023-11-03

## 2024-10-16 PROBLEM — Z98.1 HISTORY OF LUMBAR FUSION: Status: ACTIVE | Noted: 2023-11-03

## 2024-10-16 LAB
OHS QRS DURATION: 96 MS
OHS QTC CALCULATION: 444 MS

## 2024-10-16 PROCEDURE — 99999 PR PBB SHADOW E&M-EST. PATIENT-LVL IV: CPT | Mod: PBBFAC,,, | Performed by: NURSE PRACTITIONER

## 2024-10-16 PROCEDURE — 93010 ELECTROCARDIOGRAM REPORT: CPT | Mod: S$PBB,,, | Performed by: INTERNAL MEDICINE

## 2024-10-16 PROCEDURE — 99205 OFFICE O/P NEW HI 60 MIN: CPT | Mod: S$PBB,,, | Performed by: NURSE PRACTITIONER

## 2024-10-16 PROCEDURE — 93005 ELECTROCARDIOGRAM TRACING: CPT | Mod: PBBFAC | Performed by: INTERNAL MEDICINE

## 2024-10-16 RX ORDER — ALBUTEROL SULFATE 90 UG/1
2 INHALANT RESPIRATORY (INHALATION) EVERY 6 HOURS PRN
COMMUNITY

## 2024-10-16 RX ORDER — CHLORTHALIDONE 25 MG/1
25 TABLET ORAL DAILY
Qty: 30 TABLET | Refills: 11 | Status: SHIPPED | OUTPATIENT
Start: 2024-10-16 | End: 2025-10-16

## 2024-10-16 RX ORDER — DIPHENHYDRAMINE HCL 25 MG
25 CAPSULE ORAL EVERY 6 HOURS PRN
COMMUNITY

## 2024-10-16 RX ORDER — CETIRIZINE HYDROCHLORIDE 10 MG/1
10 TABLET ORAL DAILY
COMMUNITY

## 2024-10-16 NOTE — ASSESSMENT & PLAN NOTE
Has a h/o exercise induced asthma   Have not used an inhaler for 2 years   Quality 110: Preventive Care And Screening: Influenza Immunization: Influenza Immunization previously received during influenza season Detail Level: Detailed

## 2024-10-16 NOTE — ASSESSMENT & PLAN NOTE
Current BP  140/89 today.    Taking: Hygroten- pt stopped d/t no refills- refilled for patient today  No home BP readings       Lifestyle changes to reduce systolic BP:   exercise 30 minutes per day,  5 days per week or 150 minutes weekly; sodium reduction and avoidance of high salt foods such as processed meats, frozen meals and  fast foods.   Keeping a healthy weight/BMI can help with better BP control    BP acceptable for surgery. I recommend monitoring BP during perioperative period as uncontrolled pain can elevate blood pressure.

## 2024-10-16 NOTE — PROGRESS NOTES
[facility]  Progress Note    Patient Name: Orlin Mayo  MRN: [id number]  Date of Evaluation- 10/17/2024  PCP- No, Primary Doctor    Future cases for Orlin Mayo [[id number]]       Case ID Status Date Time Ivan Procedure Provider Location    [id number] [facility] 10/30/2024  8:00  T8-T11 Laminectomy and posterior instrumented fusion Dawit Carlton MD [[id number]] [facility] OR 2ND FLR            HPI:  This is a 63 y.o. male  who presents today for a preoperative evaluation in preparation for T8-T11 laminectomy  Surgery is indicated for Spondylosis with myelopathy, thoracic region.   Patient is new to me.    The history has been obtained by speaking with the patient and reviewing the electronic medical record and/or outside health information. Significant health conditions for the perioperative period are discussed below in assessment and plan.     Patient reports current health status to be Fair.  Denies any new symptoms before surgery.       Subjective/ Objective:     Chief Complaint: Preoperative evaulation, perioperative medical management, and complication reduction plan.     Functional Capacity:  Able to climb a flight of stairs without CP SOB or Syncope.  Able to meet 4 METs    Anesthesia issues: None    Difficulty mouth opening:N    Steroid use in the last 12 months: LEFT KNEE    Dental Issues:N    Family anesthesia difficulty: None     Family Hx of Thrombosis N    Past Medical History:   Diagnosis Date    Acute asthma exacerbation 02/05/2021    Anemia     Anxiety     Asthma     Cervical spondylosis 10/14/2016    Chronic back pain     COPD (chronic obstructive pulmonary disease)     Depression     GERD (gastroesophageal reflux disease)     History of prosthetic unicompartmental arthroplasty of right knee 02/24/2020    Hyperlipidemia     Hypertension 08/15/2016    Major depressive disorder, recurrent episode, mild 02/07/2008    Mild intermittent asthma, uncomplicated 07/19/2018     Past Surgical  "History:   Procedure Laterality Date    ARTHROSCOPIC REPAIR OF ROTATOR CUFF OF SHOULDER Right 08/01/2019    COLONOSCOPY      HERNIA REPAIR      JOINT REPLACEMENT      left shoulder surgery      LUMBAR FUSION      x2    REPAIR OF TRICEPS TENDON Right     3 surgeries    REPAIR OF TRICEPS TENDON Left     x1    right knee tkr Right     ROTATOR CUFF REPAIR Bilateral     2 on right , 1 to left    UNICOMPARTMENTAL ARTHROPLASTY OF KNEE Right        Review of Systems   Constitutional:  Negative for chills, fatigue, fever and unexpected weight change.   HENT:  Positive for trouble swallowing. Negative for voice change.    Eyes:  Negative for photophobia and visual disturbance.        No acute visual changes   Respiratory:  Negative for cough, shortness of breath and wheezing.         STOP bang  Score  3    High risk ISAAC   Cardiovascular:  Negative for chest pain, palpitations and leg swelling.   Gastrointestinal:  Negative for abdominal distention, abdominal pain, blood in stool, constipation, diarrhea, nausea and vomiting.        No FLD, Hepatitis, Cirrhosis  No BRB or black tarry stool   Genitourinary:  Negative for difficulty urinating, dysuria, frequency, hematuria and urgency.        Nocturia 0-3 PER WEEK   Musculoskeletal:  Positive for arthralgias, back pain, gait problem, neck pain and neck stiffness. Negative for myalgias.   Neurological:  Positive for numbness. Negative for dizziness, tremors, seizures, syncope, weakness, light-headedness and headaches.        BURNING, PRICKLING SENSATION IN FEET   Psychiatric/Behavioral:  Negative for agitation, behavioral problems, confusion, decreased concentration, dysphoric mood, hallucinations, self-injury, sleep disturbance and suicidal ideas. The patient is not nervous/anxious and is not hyperactive.         VITALS  Visit Vitals  BP (!) 140/89 (BP Location: Right arm, Patient Position: Sitting)   Pulse 70   Temp 98.5 °F (36.9 °C)   Ht 5' 8" (1.727 m)   Wt 72.5 kg (159 lb " 13.3 oz)   SpO2 96%   BMI 24.30 kg/m²          Physical Exam  Constitutional:       General: He is not in acute distress.     Appearance: He is well-developed. He is not diaphoretic.   HENT:      Head: Normocephalic.      Right Ear: Hearing normal.      Left Ear: Hearing normal.      Nose: Nose normal.      Mouth/Throat:      Lips: Pink.      Mouth: Mucous membranes are moist.   Eyes:      General: Lids are normal.      Conjunctiva/sclera: Conjunctivae normal.      Pupils: Pupils are equal, round, and reactive to light.   Neck:      Vascular: Carotid bruit present.      Trachea: Trachea and phonation normal.      Comments: Right carotid bruit  Cardiovascular:      Rate and Rhythm: Normal rate and regular rhythm.      Pulses:           Carotid pulses are 2+ on the right side and 2+ on the left side.       Radial pulses are 2+ on the right side and 2+ on the left side.        Posterior tibial pulses are 2+ on the right side and 2+ on the left side.      Heart sounds: Normal heart sounds. No murmur heard.     No friction rub. No gallop.   Pulmonary:      Effort: Pulmonary effort is normal.      Breath sounds: Normal breath sounds.      Comments: Clear and equal  Anterior and Posterior BBS  Abdominal:      General: Abdomen is protuberant. Bowel sounds are normal. There is no distension.      Palpations: Abdomen is soft.      Tenderness: There is no abdominal tenderness.   Musculoskeletal:         General: No tenderness or deformity. Normal range of motion.      Cervical back: Normal range of motion.      Right lower leg: No edema.      Left lower leg: No edema.   Lymphadenopathy:      Head:      Right side of head: No submental, submandibular, tonsillar, preauricular, posterior auricular or occipital adenopathy.      Left side of head: No submental, submandibular, tonsillar, preauricular, posterior auricular or occipital adenopathy.      Cervical:      Right cervical: No superficial cervical adenopathy.     Left  cervical: No superficial cervical adenopathy.   Skin:     General: Skin is warm and dry.      Capillary Refill: Capillary refill takes 2 to 3 seconds.      Coloration: Skin is not pale.      Findings: No erythema or rash.   Neurological:      Mental Status: He is alert and oriented to person, place, and time.      GCS: GCS eye subscore is 4. GCS verbal subscore is 5. GCS motor subscore is 6.      Motor: No abnormal muscle tone.      Coordination: Coordination normal.   Psychiatric:         Attention and Perception: Attention and perception normal.         Mood and Affect: Mood and affect normal.         Speech: Speech normal.         Behavior: Behavior normal. Behavior is cooperative.         Thought Content: Thought content normal.         Cognition and Memory: Cognition and memory normal.         Judgment: Judgment normal.          Significant Labs:  Lab Results   Component Value Date    WBC 6.52 10/16/2024    HGB 13.6 (L) 10/16/2024    HCT 40.6 10/16/2024     10/16/2024    CHOL 185 04/20/2015    TRIG 119 04/20/2015    HDL 39 (L) 04/20/2015    ALT 19 10/16/2024    AST 22 10/16/2024     10/16/2024    K 4.1 10/16/2024     10/16/2024    CREATININE 0.8 10/16/2024    BUN 19 10/16/2024    CO2 29 10/16/2024    TSH 1.536 04/20/2015    INR 1.0 10/16/2024       Diagnostic Studies: No relevant studies.    EKG:   Results for orders placed or performed during the hospital encounter of 10/16/24   EKG 12-lead    Collection Time: 10/16/24 12:32 PM   Result Value Ref Range    QRS Duration 96 ms    [facility]HS QTC Calculation 444 ms    Narrative    Test Reason : Z01.818,    Vent. Rate : 070 BPM     Atrial Rate : 070 BPM     P-R Int : 136 ms          QRS Dur : 096 ms      QT Int : 412 ms       P-R-T Axes : 040 015 057 degrees     QTc Int : 444 ms    Sinus rhythm with occasional Premature ventricular complexes  Otherwise normal ECG  No previous ECGs available  Confirmed by Russel Cheema MD ([id number]) on 10/16/2024 2:32:25  PM    Referred By: RHONDA LEOPOLD           Confirmed By:Russel Cheema MD       2D ECHO:  TTE:  No results found for this or any previous visit.    KHANH:  No results found for this or any previous visit.     Imaging     Active Cardiac Conditions: None      Revised Cardiac Risk Index   High -Risk Surgery  Intraperitoneal; Intrathoracic; suprainguinal vascular Yes- + 1 No- 0   History of Ischemic Heart Disease   (Hx of MI/positive exercise test/current chest pain due to ischemia/use of nitrate therapy/EKG with pathological Q waves) Yes- + 1 No- 0   History of CHF  (Pulmonary edema/bilateral rales or S3 gallop/PND/CXR showing pulmonary vascular redistribution) Yes- + 1 No- 0   History of CVA   (Prior stroke or TIA) Yes- + 1 No- 0   Pre-operative treatment with insulin Yes- + 1 No- 0   Pre-operative creatinine > 2mg/dl Yes- + 1 No- 0   Total:      Risk Status:  Estimated risk of cardiac complications after non-cardiac surgery using the Revised Cardiac Risk Index for Preoperative risk is 3.9 %      ARISCAT (Canet) risk index: Low: 1.6% risk of post-op pulmonary complications.    American Society of Anesthesiologists Physical Status classification (ASA): 3         No further cardiac workup needed prior to surgery.        Preoperative cardiac risk assessment-  The patient does not have any active cardiac conditions . Revised cardiac risk index predictors- ---.Functional capacity is more than 4 Mets. He will be undergoing a Spine procedure that carries a Moderate Risk risk     The estimated risk of the rate of adverse cardiac outcomes  3.9    No further cardiac work up is indicated prior to proceeding with the surgery     Orders Placed This Encounter    US Carotid Bilateral    Comprehensive Metabolic Panel    chlorthalidone (HYGROTEN) 25 MG Tab       American Society of Anesthesiologists Physical status classification ( ASA ) class: 3     Postoperative pulmonary complication risk assessment: 1.6     ARISCAT ( Canet) risk  "index- risk class -  Low, if duration of surgery is under 3 hours, intermediate, if duration of surgery is over 3 hours        Assessment/Plan:     Foot drop, bilateral  DOES NOT USE SPLINTS  Had a recent fall that he attributes to foot drop  Was not using cane at the time of fall    Alcohol dependence  Reports 2-3 drinks per night  Instructed to hold all ETOH 24 hours prior to surgery/anesthesia    Depression  No medications at this time no depression reported this time    Polysubstance dependence  H/O cocaine use in the past  Currentlyuses ETOH and THC plus a opioid that he gets in the mail from [facility]  "Tapentadol"    Allergic rhinitis  USes benadryl zyrtec Astelin, pseudoephedrine as needed for allergies    Asthma  Has a h/o exercise induced asthma   Have not used an inhaler for 2 years    Hypertensive disorder  Current BP  140/89 today.    Taking: Hygroten- pt stopped d/t no refills- refilled for patient today  No home BP readings       Lifestyle changes to reduce systolic BP:   exercise 30 minutes per day,  5 days per week or 150 minutes weekly; sodium reduction and avoidance of high salt foods such as processed meats, frozen meals and  fast foods.   Keeping a healthy weight/BMI can help with better BP control    BP acceptable for surgery. I recommend monitoring BP during perioperative period as uncontrolled pain can elevate blood pressure.         Gastroesophageal reflux disease  Currently being treated with Prilosec  Encouraged to elevate HOB and avoid laying down for 2-3 hours after meals.   Weight loss encouraged as well as dietary changes such as reduction or avoidance of fatty foods, caffeine, spicy foods, and chocolate.    Smoking cessation was recommended as well as reduction of alcohol consumption.    Marijuana smoker  Currently taking THC  Instructed not THC 3 days before surgery    Bruit of right carotid artery  Narrative & Impression  EXAMINATION:  US CAROTID BILATERAL     CLINICAL HISTORY:  Other " specified symptoms and signs involving the circulatory and respiratory systems     TECHNIQUE:  Grayscale and color Doppler ultrasound examination of the carotid and vertebral artery systems bilaterally.  Stenosis estimates are per the NASCET measurement criteria.     COMPARISON:  None.     FINDINGS:  Right:     Internal Carotid Artery (ICA) peak systolic velocity 125 cm/sec.  There is some tortuosity of the proximal ICA.     ICA/CCA peak systolic velocity ratio: 1.5     Plaque formation: Mild, homogeneous     Vertebral artery: Antegrade flow and normal waveform.     Left:     Internal Carotid Artery (ICA)  peak systolic velocity 65.4 cm/sec     ICA/CCA peak systolic velocity ratio: 0.7     Plaque formation: Minimal, homogeneous     Vertebral artery: Antegrade flow and normal waveform.     Impression:     No evidence of a hemodynamically significant carotid bifurcation stenosis.  Mild homogeneous plaque at the level the proximal right cervical ICA with mild tortuosity resulting in less than 50% stenosis.        Electronically signed by:Jason Thompson  Date:                                            10/17/2024  Time:                                           13:47    Anemia  Hgb 13.6 HCT 40.6    Hypercholesteremia  Patient has no medications at this time        Preventive perioperative care    Thromboembolic prophylaxis:  His risk factors for thrombosis include surgical procedure, age, and reduced mobility.I suggest  thromboembolic prophylaxis ( mechanical/pharmacological, weighing the risk benefits of pharmacological agent use considering brannon procedural bleeding )  during the perioperative period.I suggested being active in the post operative period.      Postoperative pulmonary complication prophylaxis-Risk factors for post operative pulmonary complications include ASA class >2- I suggest incentive spirometry use, early ambulation, and pain control so as to avoid diaphragmatic splinting  Brush teeth twice per day,  oral rinses, sleep with the head of the bed up 30 degrees     Renal complication prophylaxis-Risk factors for renal complications include age and hypertension . I suggest keeping him well hydrated and avoidance/ minimizing the use of  NSAID's,HAYWOOD 2 Inhibitors ,IV contrast if possible in the perioperative period.I suggested drinking 2 litre's of water a day      Surgical site Infection Prophylaxis-I  suggest appropriate antibiotic for Prophylaxis against Surgical site infections Shower with Hibiclens in the night before surgery and the morning of surgery     In view of Spine procedure the patient  is at risk of postoperative urinary retention.  I suggest avoidance / minimizing the of  Benzodiazepines,Anticholinergic medication,antihistamines ( Benadryl) , if possible in the perioperative period. I suggest using the minimum possible use of opioids for the minimum period of time in the perioperative period. Benadryl avoidance suggested      This visit was focused on Preoperative evaluation, Perioperative Medical management, complication reduction plans. I suggest that the patient follows up with primary care or relevant sub specialists for ongoing health care.    I appreciate the opportunity to be involved in this patients care. Please feel free to contact me if there were any questions about this consultation.    Patient is optimized      I spent a total of 62 minutes on the day of the visit.This includes face to face time and non-face to face time preparing to see the patient (eg, review of tests), obtaining and/or reviewing separately obtained history, documenting clinical information in the electronic or other health record, independently interpreting results and communicating results to the patient/family/caregiver, or care coordinator.      Mynor Modi NP  Perioperative Medicine  [facility]   Pager [phone]

## 2024-10-16 NOTE — DISCHARGE INSTRUCTIONS
Your surgery has been scheduled for:_______10/30/24___________________________________    You should report to:  ____[facility], located on the [place] side of the first floor of the           [facility] ([phone])  _X___[facility], located on the [facility] side of the            Second floor of the [facility] ([phone])  ____3rd Floor [facility] located on the [facility] side of the [facility] [phone]  ____[facility]: located at [address] [facility] [address] [place] [address]. [address]     Please Note   Tell your doctor if you take Aspirin, products containing Aspirin, herbal medications  or blood thinners, such as Coumadin, Ticlid, or Plavix.  (Consult your provider regarding holding or stopping before surgery).  Arrange for someone to drive you home following surgery.  You will not be allowed to leave the surgical facility alone or drive yourself home following sedation and anesthesia.    Before Surgery  Stop taking all herbal medications, vitamins, and supplements 7 days prior to surgery  No Motrin/Advil (Ibuprofen) 7 days before surgery  No Aleve (Naproxen) 7 days before surgery   No Goody's/BC Powder 7 days before surgery  Refrain from drinking alcoholic beverages for 24 hours before and after surgery  Stop or limit smoking at least 24 hours prior to surgery  You may take Tylenol for pain    Night before Surgery  Do not eat or drink after midnight  Take a shower or bath (shower is recommended).  Bathe with Hibiclens soap or an antibacterial soap from the neck down.  If not supplied by your surgeon, hibiclens soap will need to be purchased over the counter in pharmacy.  Rinse soap off thoroughly.  Shampoo your hair with your regular shampoo    The Day of Surgery  Take another bath or shower with hibiclens or any antibacterial soap, to reduce the chance of infection.  Take heart  and blood pressure medications with a small sip of water, as advised by the perioperative team.  Do not take fluid pills  You may brush your teeth and rinse your mouth, but do not swallow any additional water.   Do not apply perfumes, powder, body lotions or deodorant on the day of surgery.  Nail polish should be removed.  Do not wear makeup or moisturizer  Wear comfortable clothes, such as a button front shirt and loose fitting pants.  Leave all jewelry, including body piercings, and valuables at home.    Bring any devices you will need after surgery such as crutches or canes.  If you have sleep apnea, please bring your CPAP machine  In the event that your physical condition changes including the onset of a cold or respiratory illness, or if you have to delay or cancel your surgery, please notify your surgeon.

## 2024-10-16 NOTE — Clinical Note
Patient is pending optimization Carotid bruit - US ordered and scheduled for 10/17/24 Patient ran out of BP meds- ordered refill Restart Hygroten

## 2024-10-16 NOTE — OUTPATIENT SUBJECTIVE & OBJECTIVE
Outpatient Subjective & Objective      Chief Complaint: Preoperative evaulation, perioperative medical management, and complication reduction plan.     Functional Capacity:  Able to climb a flight of stairs without CP SOB or Syncope.  Able to meet 4 METs    Anesthesia issues: None    Difficulty mouth opening:N    Steroid use in the last 12 months: LEFT KNEE    Dental Issues:N    Family anesthesia difficulty: None     Family Hx of Thrombosis N    Past Medical History:   Diagnosis Date    Acute asthma exacerbation 02/05/2021    Anemia     Anxiety     Asthma     Cervical spondylosis 10/14/2016    Chronic back pain     COPD (chronic obstructive pulmonary disease)     Depression     GERD (gastroesophageal reflux disease)     History of prosthetic unicompartmental arthroplasty of right knee 02/24/2020    Hyperlipidemia     Hypertension 08/15/2016    Major depressive disorder, recurrent episode, mild 02/07/2008    Mild intermittent asthma, uncomplicated 07/19/2018     Past Surgical History:   Procedure Laterality Date    ARTHROSCOPIC REPAIR OF ROTATOR CUFF OF SHOULDER Right 08/01/2019    COLONOSCOPY      HERNIA REPAIR      JOINT REPLACEMENT      left shoulder surgery      LUMBAR FUSION      x2    REPAIR OF TRICEPS TENDON Right     3 surgeries    REPAIR OF TRICEPS TENDON Left     x1    right knee tkr Right     ROTATOR CUFF REPAIR Bilateral     2 on right , 1 to left    UNICOMPARTMENTAL ARTHROPLASTY OF KNEE Right        Review of Systems   Constitutional:  Negative for chills, fatigue, fever and unexpected weight change.   HENT:  Positive for trouble swallowing. Negative for voice change.    Eyes:  Negative for photophobia and visual disturbance.        No acute visual changes   Respiratory:  Negative for cough, shortness of breath and wheezing.         STOP bang  Score  3    High risk ISAAC   Cardiovascular:  Negative for chest pain, palpitations and leg swelling.   Gastrointestinal:  Negative for abdominal distention,  "abdominal pain, blood in stool, constipation, diarrhea, nausea and vomiting.        No FLD, Hepatitis, Cirrhosis  No BRB or black tarry stool   Genitourinary:  Negative for difficulty urinating, dysuria, frequency, hematuria and urgency.        Nocturia 0-3 PER WEEK   Musculoskeletal:  Positive for arthralgias, back pain, gait problem, neck pain and neck stiffness. Negative for myalgias.   Neurological:  Positive for numbness. Negative for dizziness, tremors, seizures, syncope, weakness, light-headedness and headaches.        BURNING, PRICKLING SENSATION IN FEET   Psychiatric/Behavioral:  Negative for agitation, behavioral problems, confusion, decreased concentration, dysphoric mood, hallucinations, self-injury, sleep disturbance and suicidal ideas. The patient is not nervous/anxious and is not hyperactive.         VITALS  Visit Vitals  BP (!) 140/89 (BP Location: Right arm, Patient Position: Sitting)   Pulse 70   Temp 98.5 °F (36.9 °C)   Ht 5' 8" (1.727 m)   Wt 72.5 kg (159 lb 13.3 oz)   SpO2 96%   BMI 24.30 kg/m²          Physical Exam  Constitutional:       General: He is not in acute distress.     Appearance: He is well-developed. He is not diaphoretic.   HENT:      Head: Normocephalic.      Right Ear: Hearing normal.      Left Ear: Hearing normal.      Nose: Nose normal.      Mouth/Throat:      Lips: Pink.      Mouth: Mucous membranes are moist.   Eyes:      General: Lids are normal.      Conjunctiva/sclera: Conjunctivae normal.      Pupils: Pupils are equal, round, and reactive to light.   Neck:      Vascular: Carotid bruit present.      Trachea: Trachea and phonation normal.      Comments: Right carotid bruit  Cardiovascular:      Rate and Rhythm: Normal rate and regular rhythm.      Pulses:           Carotid pulses are 2+ on the right side and 2+ on the left side.       Radial pulses are 2+ on the right side and 2+ on the left side.        Posterior tibial pulses are 2+ on the right side and 2+ on the left " side.      Heart sounds: Normal heart sounds. No murmur heard.     No friction rub. No gallop.   Pulmonary:      Effort: Pulmonary effort is normal.      Breath sounds: Normal breath sounds.      Comments: Clear and equal  Anterior and Posterior BBS  Abdominal:      General: Abdomen is protuberant. Bowel sounds are normal. There is no distension.      Palpations: Abdomen is soft.      Tenderness: There is no abdominal tenderness.   Musculoskeletal:         General: No tenderness or deformity. Normal range of motion.      Cervical back: Normal range of motion.      Right lower leg: No edema.      Left lower leg: No edema.   Lymphadenopathy:      Head:      Right side of head: No submental, submandibular, tonsillar, preauricular, posterior auricular or occipital adenopathy.      Left side of head: No submental, submandibular, tonsillar, preauricular, posterior auricular or occipital adenopathy.      Cervical:      Right cervical: No superficial cervical adenopathy.     Left cervical: No superficial cervical adenopathy.   Skin:     General: Skin is warm and dry.      Capillary Refill: Capillary refill takes 2 to 3 seconds.      Coloration: Skin is not pale.      Findings: No erythema or rash.   Neurological:      Mental Status: He is alert and oriented to person, place, and time.      GCS: GCS eye subscore is 4. GCS verbal subscore is 5. GCS motor subscore is 6.      Motor: No abnormal muscle tone.      Coordination: Coordination normal.   Psychiatric:         Attention and Perception: Attention and perception normal.         Mood and Affect: Mood and affect normal.         Speech: Speech normal.         Behavior: Behavior normal. Behavior is cooperative.         Thought Content: Thought content normal.         Cognition and Memory: Cognition and memory normal.         Judgment: Judgment normal.          Significant Labs:  Lab Results   Component Value Date    WBC 6.52 10/16/2024    HGB 13.6 (L) 10/16/2024    HCT 40.6  10/16/2024     10/16/2024    CHOL 185 04/20/2015    TRIG 119 04/20/2015    HDL 39 (L) 04/20/2015    ALT 19 10/16/2024    AST 22 10/16/2024     10/16/2024    K 4.1 10/16/2024     10/16/2024    CREATININE 0.8 10/16/2024    BUN 19 10/16/2024    CO2 29 10/16/2024    TSH 1.536 04/20/2015    INR 1.0 10/16/2024       Diagnostic Studies: No relevant studies.    EKG:   Results for orders placed or performed during the hospital encounter of 10/16/24   EKG 12-lead    Collection Time: 10/16/24 12:32 PM   Result Value Ref Range    QRS Duration 96 ms    OHS QTC Calculation 444 ms    Narrative    Test Reason : Z01.818,    Vent. Rate : 070 BPM     Atrial Rate : 070 BPM     P-R Int : 136 ms          QRS Dur : 096 ms      QT Int : 412 ms       P-R-T Axes : 040 015 057 degrees     QTc Int : 444 ms    Sinus rhythm with occasional Premature ventricular complexes  Otherwise normal ECG  No previous ECGs available  Confirmed by Russel Cheema MD ([id number]) on 10/16/2024 2:32:25 PM    Referred By: RHONDA LEOPOLD           Confirmed By:Russel Cheema MD       2D ECHO:  TTE:  No results found for this or any previous visit.    KHANH:  No results found for this or any previous visit.     Imaging     Active Cardiac Conditions: None      Revised Cardiac Risk Index   High -Risk Surgery  Intraperitoneal; Intrathoracic; suprainguinal vascular Yes- + 1 No- 0   History of Ischemic Heart Disease   (Hx of MI/positive exercise test/current chest pain due to ischemia/use of nitrate therapy/EKG with pathological Q waves) Yes- + 1 No- 0   History of CHF  (Pulmonary edema/bilateral rales or S3 gallop/PND/CXR showing pulmonary vascular redistribution) Yes- + 1 No- 0   History of CVA   (Prior stroke or TIA) Yes- + 1 No- 0   Pre-operative treatment with insulin Yes- + 1 No- 0   Pre-operative creatinine > 2mg/dl Yes- + 1 No- 0   Total:      Risk Status:  Estimated risk of cardiac complications after non-cardiac surgery using the Revised Cardiac  Risk Index for Preoperative risk is 3.9 %      ARISCAT (Canet) risk index: Low: 1.6% risk of post-op pulmonary complications.    American Society of Anesthesiologists Physical Status classification (ASA): 3         No further cardiac workup needed prior to surgery.    Outpatient Subjective & Objective

## 2024-10-16 NOTE — ASSESSMENT & PLAN NOTE
Narrative & Impression  EXAMINATION:  US CAROTID BILATERAL     CLINICAL HISTORY:  Other specified symptoms and signs involving the circulatory and respiratory systems     TECHNIQUE:  Grayscale and color Doppler ultrasound examination of the carotid and vertebral artery systems bilaterally.  Stenosis estimates are per the NASCET measurement criteria.     COMPARISON:  None.     FINDINGS:  Right:     Internal Carotid Artery (ICA) peak systolic velocity 125 cm/sec.  There is some tortuosity of the proximal ICA.     ICA/CCA peak systolic velocity ratio: 1.5     Plaque formation: Mild, homogeneous     Vertebral artery: Antegrade flow and normal waveform.     Left:     Internal Carotid Artery (ICA)  peak systolic velocity 65.4 cm/sec     ICA/CCA peak systolic velocity ratio: 0.7     Plaque formation: Minimal, homogeneous     Vertebral artery: Antegrade flow and normal waveform.     Impression:     No evidence of a hemodynamically significant carotid bifurcation stenosis.  Mild homogeneous plaque at the level the proximal right cervical ICA with mild tortuosity resulting in less than 50% stenosis.        Electronically signed by:Jason Thompson  Date:                                            10/17/2024  Time:                                           13:47

## 2024-10-16 NOTE — ASSESSMENT & PLAN NOTE
Currently being treated with Prilosec  Encouraged to elevate HOB and avoid laying down for 2-3 hours after meals.   Weight loss encouraged as well as dietary changes such as reduction or avoidance of fatty foods, caffeine, spicy foods, and chocolate.    Smoking cessation was recommended as well as reduction of alcohol consumption.

## 2024-10-16 NOTE — ASSESSMENT & PLAN NOTE
"H/O cocaine use in the past  Currentlyuses ETOH and THC plus a opioid that he gets in the mail from [facility]  "Tapentadol"  "

## 2024-10-16 NOTE — TELEPHONE ENCOUNTER
Pt came in with his lso brace however I explained that dr. Clinton is doing thoracic surgery and ordered a tlso brace.  Pt. Verbalized understanding.

## 2024-10-16 NOTE — ASSESSMENT & PLAN NOTE
DOES NOT USE SPLINTS  Had a recent fall that he attributes to foot drop  Was not using cane at the time of fall

## 2024-10-16 NOTE — HPI
This is a 63 y.o. male  who presents today for a preoperative evaluation in preparation for T8-T11 laminectomy  Surgery is indicated for Spondylosis with myelopathy, thoracic region.   Patient is new to me.    The history has been obtained by speaking with the patient and reviewing the electronic medical record and/or outside health information. Significant health conditions for the perioperative period are discussed below in assessment and plan.     Patient reports current health status to be Fair.  Denies any new symptoms before surgery.

## 2024-10-17 ENCOUNTER — HOSPITAL ENCOUNTER (OUTPATIENT)
Dept: RADIOLOGY | Facility: HOSPITAL | Age: 64
Discharge: HOME | End: 2024-10-17
Attending: NURSE PRACTITIONER
Payer: MEDICARE

## 2024-10-17 DIAGNOSIS — R09.89 RIGHT CAROTID BRUIT: ICD-10-CM

## 2024-10-17 PROCEDURE — 93880 EXTRACRANIAL BILAT STUDY: CPT | Mod: TC,PO

## 2024-10-17 PROCEDURE — 93880 EXTRACRANIAL BILAT STUDY: CPT | Mod: 26,,, | Performed by: RADIOLOGY

## 2024-10-22 NOTE — TELEPHONE ENCOUNTER
Called patient to discuss question today about SCS trial and explant. He follows with an external pain management group who recommended trial tomorrow with explant 10/29 to address his low back pain prior to thoracic laminectomy planned for 10/30. Hx prior L2-S1 fusion with proximal junction scoliosis. Advised against this prior to his surgery next week. If he does proceed with SCS trial/explant we will need to delay his surgery. All questions answered.

## 2024-10-29 ENCOUNTER — TELEPHONE (OUTPATIENT)
Dept: NEUROSURGERY | Facility: CLINIC | Age: 64
End: 2024-10-29
Payer: MEDICARE

## 2024-10-30 ENCOUNTER — HOSPITAL ENCOUNTER (INPATIENT)
Facility: HOSPITAL | Age: 64
LOS: 2 days | Discharge: HOME | DRG: 448 | End: 2024-11-01
Attending: NEUROLOGICAL SURGERY | Admitting: NEUROLOGICAL SURGERY
Payer: MEDICARE

## 2024-10-30 ENCOUNTER — ANESTHESIA EVENT (OUTPATIENT)
Dept: SURGERY | Facility: HOSPITAL | Age: 64
End: 2024-10-30
Payer: MEDICARE

## 2024-10-30 ENCOUNTER — ANESTHESIA (OUTPATIENT)
Dept: SURGERY | Facility: HOSPITAL | Age: 64
End: 2024-10-30
Payer: MEDICARE

## 2024-10-30 DIAGNOSIS — M47.14 THORACIC MYELOPATHY: ICD-10-CM

## 2024-10-30 PROCEDURE — 22614 ARTHRD PST TQ 1NTRSPC EA ADD: CPT | Mod: ,,, | Performed by: NEUROLOGICAL SURGERY

## 2024-10-30 PROCEDURE — 25000003 PHARM REV CODE 250: Performed by: STUDENT IN AN ORGANIZED HEALTH CARE EDUCATION/TRAINING PROGRAM

## 2024-10-30 PROCEDURE — 63046 LAM FACETEC & FORAMOT THRC: CPT | Mod: 51,GC,, | Performed by: NEUROLOGICAL SURGERY

## 2024-10-30 PROCEDURE — 63600175 PHARM REV CODE 636 W HCPCS: Performed by: ANESTHESIOLOGY

## 2024-10-30 PROCEDURE — 27201423 OPTIME MED/SURG SUP & DEVICES STERILE SUPPLY: Performed by: NEUROLOGICAL SURGERY

## 2024-10-30 PROCEDURE — 36415 COLL VENOUS BLD VENIPUNCTURE: CPT | Performed by: ANESTHESIOLOGY

## 2024-10-30 PROCEDURE — 27000221 HC OXYGEN, UP TO 24 HOURS

## 2024-10-30 PROCEDURE — 71000015 HC POSTOP RECOV 1ST HR: Performed by: NEUROLOGICAL SURGERY

## 2024-10-30 PROCEDURE — C1729 CATH, DRAINAGE: HCPCS | Performed by: NEUROLOGICAL SURGERY

## 2024-10-30 PROCEDURE — 63048 LAM FACETEC &FORAMOT EA ADDL: CPT | Mod: ,,, | Performed by: NEUROLOGICAL SURGERY

## 2024-10-30 PROCEDURE — 63600175 PHARM REV CODE 636 W HCPCS: Performed by: STUDENT IN AN ORGANIZED HEALTH CARE EDUCATION/TRAINING PROGRAM

## 2024-10-30 PROCEDURE — 00NX0ZZ RELEASE THORACIC SPINAL CORD, OPEN APPROACH: ICD-10-PCS | Performed by: NEUROLOGICAL SURGERY

## 2024-10-30 PROCEDURE — 99900035 HC TECH TIME PER 15 MIN (STAT)

## 2024-10-30 PROCEDURE — 37000009 HC ANESTHESIA EA ADD 15 MINS: Performed by: NEUROLOGICAL SURGERY

## 2024-10-30 PROCEDURE — 22610 ARTHRD PST TQ 1NTRSPC THRC: CPT | Mod: GC,,, | Performed by: NEUROLOGICAL SURGERY

## 2024-10-30 PROCEDURE — 37000008 HC ANESTHESIA 1ST 15 MINUTES: Performed by: NEUROLOGICAL SURGERY

## 2024-10-30 PROCEDURE — 36000711: Performed by: NEUROLOGICAL SURGERY

## 2024-10-30 PROCEDURE — 20936 SP BONE AGRFT LOCAL ADD-ON: CPT | Mod: ,,, | Performed by: NEUROLOGICAL SURGERY

## 2024-10-30 PROCEDURE — 63600175 PHARM REV CODE 636 W HCPCS: Performed by: NEUROLOGICAL SURGERY

## 2024-10-30 PROCEDURE — 22842 INSERT SPINE FIXATION DEVICE: CPT | Mod: ,,, | Performed by: NEUROLOGICAL SURGERY

## 2024-10-30 PROCEDURE — 63600175 PHARM REV CODE 636 W HCPCS: Performed by: NURSE ANESTHETIST, CERTIFIED REGISTERED

## 2024-10-30 PROCEDURE — 94761 N-INVAS EAR/PLS OXIMETRY MLT: CPT

## 2024-10-30 PROCEDURE — 11000001 HC ACUTE MED/SURG PRIVATE ROOM

## 2024-10-30 PROCEDURE — 71000033 HC RECOVERY, INTIAL HOUR: Performed by: NEUROLOGICAL SURGERY

## 2024-10-30 PROCEDURE — 71000016 HC POSTOP RECOV ADDL HR: Performed by: NEUROLOGICAL SURGERY

## 2024-10-30 PROCEDURE — 25000003 PHARM REV CODE 250: Performed by: NURSE ANESTHETIST, CERTIFIED REGISTERED

## 2024-10-30 PROCEDURE — C1713 ANCHOR/SCREW BN/BN,TIS/BN: HCPCS | Performed by: NEUROLOGICAL SURGERY

## 2024-10-30 PROCEDURE — 36000710: Performed by: NEUROLOGICAL SURGERY

## 2024-10-30 PROCEDURE — 36620 INSERTION CATHETER ARTERY: CPT | Mod: 59,,, | Performed by: ANESTHESIOLOGY

## 2024-10-30 PROCEDURE — 27201037 HC PRESSURE MONITORING SET UP

## 2024-10-30 PROCEDURE — 0RG7071 FUSION OF 2 TO 7 THORACIC VERTEBRAL JOINTS WITH AUTOLOGOUS TISSUE SUBSTITUTE, POSTERIOR APPROACH, POSTERIOR COLUMN, OPEN APPROACH: ICD-10-PCS | Performed by: NEUROLOGICAL SURGERY

## 2024-10-30 DEVICE — ROD SPINE MTRX STR 400X80MM: Type: IMPLANTABLE DEVICE | Site: SPINE THORACIC | Status: FUNCTIONAL

## 2024-10-30 DEVICE — SCREW POLYAXIAL 5X35MM: Type: IMPLANTABLE DEVICE | Site: SPINE THORACIC | Status: FUNCTIONAL

## 2024-10-30 DEVICE — SCREW TI EXP VERSE 5.5 5X35MM: Type: IMPLANTABLE DEVICE | Site: SPINE THORACIC | Status: FUNCTIONAL

## 2024-10-30 DEVICE — SCREW INNER SINGLE SET TITANIU: Type: IMPLANTABLE DEVICE | Site: SPINE THORACIC | Status: FUNCTIONAL

## 2024-10-30 DEVICE — SET SCREW EXPEDIUM VERSE UNITZ: Type: IMPLANTABLE DEVICE | Site: SPINE THORACIC | Status: FUNCTIONAL

## 2024-10-30 RX ORDER — DEXMEDETOMIDINE HYDROCHLORIDE 100 UG/ML
INJECTION, SOLUTION INTRAVENOUS
Status: DISCONTINUED | OUTPATIENT
Start: 2024-10-30 | End: 2024-10-30

## 2024-10-30 RX ORDER — MUPIROCIN 20 MG/G
1 OINTMENT TOPICAL 2 TIMES DAILY
Status: DISCONTINUED | OUTPATIENT
Start: 2024-10-30 | End: 2024-10-30 | Stop reason: HOSPADM

## 2024-10-30 RX ORDER — CEFAZOLIN 2 G/1
2 INJECTION, POWDER, FOR SOLUTION INTRAMUSCULAR; INTRAVENOUS
Status: CANCELLED | OUTPATIENT
Start: 2024-10-30 | End: 2024-11-04

## 2024-10-30 RX ORDER — PROCHLORPERAZINE EDISYLATE 5 MG/ML
5 INJECTION INTRAMUSCULAR; INTRAVENOUS EVERY 6 HOURS PRN
Status: CANCELLED | OUTPATIENT
Start: 2024-10-30

## 2024-10-30 RX ORDER — AMOXICILLIN 250 MG
2 CAPSULE ORAL 2 TIMES DAILY
Status: CANCELLED | OUTPATIENT
Start: 2024-10-30

## 2024-10-30 RX ORDER — ONDANSETRON 8 MG/1
8 TABLET, ORALLY DISINTEGRATING ORAL EVERY 6 HOURS PRN
Status: DISCONTINUED | OUTPATIENT
Start: 2024-10-30 | End: 2024-11-01 | Stop reason: HOSPADM

## 2024-10-30 RX ORDER — MUPIROCIN 20 MG/G
OINTMENT TOPICAL
Status: DISCONTINUED | OUTPATIENT
Start: 2024-10-30 | End: 2024-10-30 | Stop reason: HOSPADM

## 2024-10-30 RX ORDER — PROCHLORPERAZINE EDISYLATE 5 MG/ML
5 INJECTION INTRAMUSCULAR; INTRAVENOUS EVERY 6 HOURS PRN
Status: DISCONTINUED | OUTPATIENT
Start: 2024-10-30 | End: 2024-11-01 | Stop reason: HOSPADM

## 2024-10-30 RX ORDER — HYDROMORPHONE HYDROCHLORIDE 1 MG/ML
1 INJECTION, SOLUTION INTRAMUSCULAR; INTRAVENOUS; SUBCUTANEOUS EVERY 4 HOURS PRN
Status: CANCELLED | OUTPATIENT
Start: 2024-10-30

## 2024-10-30 RX ORDER — ONDANSETRON HYDROCHLORIDE 2 MG/ML
4 INJECTION, SOLUTION INTRAVENOUS DAILY PRN
Status: DISCONTINUED | OUTPATIENT
Start: 2024-10-30 | End: 2024-10-30 | Stop reason: HOSPADM

## 2024-10-30 RX ORDER — OXYCODONE HYDROCHLORIDE 5 MG/1
5 TABLET ORAL EVERY 4 HOURS PRN
Status: DISCONTINUED | OUTPATIENT
Start: 2024-10-30 | End: 2024-11-01 | Stop reason: HOSPADM

## 2024-10-30 RX ORDER — ONDANSETRON HYDROCHLORIDE 2 MG/ML
INJECTION, SOLUTION INTRAVENOUS
Status: DISCONTINUED | OUTPATIENT
Start: 2024-10-30 | End: 2024-10-30

## 2024-10-30 RX ORDER — CALCIUM CARBONATE 200(500)MG
500 TABLET,CHEWABLE ORAL 2 TIMES DAILY
Status: CANCELLED | OUTPATIENT
Start: 2024-10-30

## 2024-10-30 RX ORDER — OXYCODONE HYDROCHLORIDE 10 MG/1
10 TABLET ORAL EVERY 4 HOURS PRN
Status: CANCELLED | OUTPATIENT
Start: 2024-10-30

## 2024-10-30 RX ORDER — ALUMINUM HYDROXIDE, MAGNESIUM HYDROXIDE, AND SIMETHICONE 1200; 120; 1200 MG/30ML; MG/30ML; MG/30ML
30 SUSPENSION ORAL EVERY 4 HOURS PRN
Status: CANCELLED | OUTPATIENT
Start: 2024-10-30

## 2024-10-30 RX ORDER — MUPIROCIN 20 MG/G
OINTMENT TOPICAL 2 TIMES DAILY
Status: DISCONTINUED | OUTPATIENT
Start: 2024-10-30 | End: 2024-11-01 | Stop reason: HOSPADM

## 2024-10-30 RX ORDER — ACETAMINOPHEN 10 MG/ML
INJECTION, SOLUTION INTRAVENOUS
Status: DISCONTINUED | OUTPATIENT
Start: 2024-10-30 | End: 2024-10-30

## 2024-10-30 RX ORDER — ROCURONIUM BROMIDE 10 MG/ML
INJECTION, SOLUTION INTRAVENOUS
Status: DISCONTINUED | OUTPATIENT
Start: 2024-10-30 | End: 2024-10-30

## 2024-10-30 RX ORDER — DIPHENHYDRAMINE HCL 25 MG
25 CAPSULE ORAL EVERY 6 HOURS PRN
Status: DISCONTINUED | OUTPATIENT
Start: 2024-10-30 | End: 2024-11-01 | Stop reason: HOSPADM

## 2024-10-30 RX ORDER — SODIUM CHLORIDE 9 MG/ML
INJECTION, SOLUTION INTRAVENOUS CONTINUOUS
Status: DISCONTINUED | OUTPATIENT
Start: 2024-10-30 | End: 2024-10-31

## 2024-10-30 RX ORDER — MEPERIDINE HYDROCHLORIDE 50 MG/ML
12.5 INJECTION INTRAMUSCULAR; INTRAVENOUS; SUBCUTANEOUS ONCE AS NEEDED
Status: DISCONTINUED | OUTPATIENT
Start: 2024-10-30 | End: 2024-10-30 | Stop reason: HOSPADM

## 2024-10-30 RX ORDER — DEXAMETHASONE SODIUM PHOSPHATE 4 MG/ML
INJECTION, SOLUTION INTRA-ARTICULAR; INTRALESIONAL; INTRAMUSCULAR; INTRAVENOUS; SOFT TISSUE
Status: DISCONTINUED | OUTPATIENT
Start: 2024-10-30 | End: 2024-10-30

## 2024-10-30 RX ORDER — AMOXICILLIN 250 MG
2 CAPSULE ORAL 2 TIMES DAILY
Status: DISCONTINUED | OUTPATIENT
Start: 2024-10-30 | End: 2024-11-01 | Stop reason: HOSPADM

## 2024-10-30 RX ORDER — METHOCARBAMOL 750 MG/1
750 TABLET, FILM COATED ORAL 4 TIMES DAILY
Status: DISCONTINUED | OUTPATIENT
Start: 2024-10-30 | End: 2024-11-01 | Stop reason: HOSPADM

## 2024-10-30 RX ORDER — METHOCARBAMOL 750 MG/1
750 TABLET, FILM COATED ORAL 4 TIMES DAILY
Status: CANCELLED | OUTPATIENT
Start: 2024-10-30

## 2024-10-30 RX ORDER — OXYCODONE HYDROCHLORIDE 10 MG/1
10 TABLET ORAL EVERY 4 HOURS PRN
Status: DISCONTINUED | OUTPATIENT
Start: 2024-10-30 | End: 2024-11-01 | Stop reason: HOSPADM

## 2024-10-30 RX ORDER — PREGABALIN 75 MG/1
75 CAPSULE ORAL 2 TIMES DAILY
Status: DISCONTINUED | OUTPATIENT
Start: 2024-10-30 | End: 2024-11-01 | Stop reason: HOSPADM

## 2024-10-30 RX ORDER — FENTANYL CITRATE 50 UG/ML
INJECTION, SOLUTION INTRAMUSCULAR; INTRAVENOUS
Status: DISCONTINUED | OUTPATIENT
Start: 2024-10-30 | End: 2024-10-30

## 2024-10-30 RX ORDER — ZOLPIDEM TARTRATE 5 MG/1
5 TABLET ORAL NIGHTLY PRN
Status: DISCONTINUED | OUTPATIENT
Start: 2024-10-30 | End: 2024-11-01 | Stop reason: HOSPADM

## 2024-10-30 RX ORDER — CEFAZOLIN 2 G/1
INJECTION, POWDER, FOR SOLUTION INTRAMUSCULAR; INTRAVENOUS
Status: DISCONTINUED | OUTPATIENT
Start: 2024-10-30 | End: 2024-10-30

## 2024-10-30 RX ORDER — ACETAMINOPHEN 500 MG
1000 TABLET ORAL EVERY 8 HOURS
Status: CANCELLED | OUTPATIENT
Start: 2024-10-30

## 2024-10-30 RX ORDER — TALC
6 POWDER (GRAM) TOPICAL NIGHTLY PRN
Status: CANCELLED | OUTPATIENT
Start: 2024-10-30

## 2024-10-30 RX ORDER — MIDAZOLAM HYDROCHLORIDE 1 MG/ML
INJECTION, SOLUTION INTRAMUSCULAR; INTRAVENOUS
Status: DISCONTINUED | OUTPATIENT
Start: 2024-10-30 | End: 2024-10-30

## 2024-10-30 RX ORDER — SODIUM CHLORIDE 9 MG/ML
INJECTION, SOLUTION INTRAVENOUS CONTINUOUS
Status: CANCELLED | OUTPATIENT
Start: 2024-10-30 | End: 2024-11-01

## 2024-10-30 RX ORDER — OXYCODONE HYDROCHLORIDE 5 MG/1
5 TABLET ORAL EVERY 4 HOURS PRN
Status: CANCELLED | OUTPATIENT
Start: 2024-10-30

## 2024-10-30 RX ORDER — REMIFENTANIL HYDROCHLORIDE 1 MG/ML
INJECTION, POWDER, LYOPHILIZED, FOR SOLUTION INTRAVENOUS CONTINUOUS PRN
Status: DISCONTINUED | OUTPATIENT
Start: 2024-10-30 | End: 2024-10-30

## 2024-10-30 RX ORDER — GLUCAGON 1 MG
1 KIT INJECTION
Status: DISCONTINUED | OUTPATIENT
Start: 2024-10-30 | End: 2024-10-30 | Stop reason: HOSPADM

## 2024-10-30 RX ORDER — TALC
6 POWDER (GRAM) TOPICAL NIGHTLY PRN
Status: DISCONTINUED | OUTPATIENT
Start: 2024-10-30 | End: 2024-10-30

## 2024-10-30 RX ORDER — LIDOCAINE HYDROCHLORIDE 20 MG/ML
INJECTION, SOLUTION EPIDURAL; INFILTRATION; INTRACAUDAL; PERINEURAL
Status: DISCONTINUED | OUTPATIENT
Start: 2024-10-30 | End: 2024-10-30

## 2024-10-30 RX ORDER — ONDANSETRON 8 MG/1
8 TABLET, ORALLY DISINTEGRATING ORAL EVERY 6 HOURS PRN
Status: CANCELLED | OUTPATIENT
Start: 2024-10-30

## 2024-10-30 RX ORDER — HYDROMORPHONE HYDROCHLORIDE 1 MG/ML
0.2 INJECTION, SOLUTION INTRAMUSCULAR; INTRAVENOUS; SUBCUTANEOUS EVERY 5 MIN PRN
Status: DISCONTINUED | OUTPATIENT
Start: 2024-10-30 | End: 2024-10-30 | Stop reason: HOSPADM

## 2024-10-30 RX ORDER — KETAMINE HCL IN 0.9 % NACL 50 MG/5 ML
SYRINGE (ML) INTRAVENOUS
Status: DISCONTINUED | OUTPATIENT
Start: 2024-10-30 | End: 2024-10-30

## 2024-10-30 RX ORDER — SUCCINYLCHOLINE CHLORIDE 20 MG/ML
INJECTION INTRAMUSCULAR; INTRAVENOUS
Status: DISCONTINUED | OUTPATIENT
Start: 2024-10-30 | End: 2024-10-30

## 2024-10-30 RX ORDER — LOSARTAN POTASSIUM 25 MG/1
25 TABLET ORAL DAILY
Status: DISCONTINUED | OUTPATIENT
Start: 2024-10-31 | End: 2024-11-01 | Stop reason: HOSPADM

## 2024-10-30 RX ORDER — PHENYLEPHRINE HYDROCHLORIDE 10 MG/ML
INJECTION INTRAVENOUS
Status: DISCONTINUED | OUTPATIENT
Start: 2024-10-30 | End: 2024-10-30

## 2024-10-30 RX ORDER — HYDROMORPHONE HYDROCHLORIDE 1 MG/ML
INJECTION, SOLUTION INTRAMUSCULAR; INTRAVENOUS; SUBCUTANEOUS
Status: DISCONTINUED | OUTPATIENT
Start: 2024-10-30 | End: 2024-10-30

## 2024-10-30 RX ORDER — MUPIROCIN 20 MG/G
OINTMENT TOPICAL 2 TIMES DAILY
Status: CANCELLED | OUTPATIENT
Start: 2024-10-30 | End: 2024-11-01

## 2024-10-30 RX ORDER — VANCOMYCIN HYDROCHLORIDE 1 G/20ML
INJECTION, POWDER, LYOPHILIZED, FOR SOLUTION INTRAVENOUS
Status: DISCONTINUED | OUTPATIENT
Start: 2024-10-30 | End: 2024-10-30 | Stop reason: HOSPADM

## 2024-10-30 RX ORDER — ALUMINUM HYDROXIDE, MAGNESIUM HYDROXIDE, AND SIMETHICONE 1200; 120; 1200 MG/30ML; MG/30ML; MG/30ML
30 SUSPENSION ORAL EVERY 4 HOURS PRN
Status: DISCONTINUED | OUTPATIENT
Start: 2024-10-30 | End: 2024-11-01 | Stop reason: HOSPADM

## 2024-10-30 RX ORDER — PROPOFOL 10 MG/ML
VIAL (ML) INTRAVENOUS
Status: DISCONTINUED | OUTPATIENT
Start: 2024-10-30 | End: 2024-10-30

## 2024-10-30 RX ORDER — HYDROMORPHONE HYDROCHLORIDE 1 MG/ML
1 INJECTION, SOLUTION INTRAMUSCULAR; INTRAVENOUS; SUBCUTANEOUS EVERY 4 HOURS PRN
Status: DISCONTINUED | OUTPATIENT
Start: 2024-10-30 | End: 2024-11-01 | Stop reason: HOSPADM

## 2024-10-30 RX ORDER — ACETAMINOPHEN 500 MG
1000 TABLET ORAL EVERY 8 HOURS
Status: DISCONTINUED | OUTPATIENT
Start: 2024-10-30 | End: 2024-11-01 | Stop reason: HOSPADM

## 2024-10-30 RX ORDER — CEFAZOLIN 2 G/1
2 INJECTION, POWDER, FOR SOLUTION INTRAMUSCULAR; INTRAVENOUS
Status: DISCONTINUED | OUTPATIENT
Start: 2024-10-30 | End: 2024-11-01 | Stop reason: HOSPADM

## 2024-10-30 RX ORDER — CALCIUM CARBONATE 200(500)MG
500 TABLET,CHEWABLE ORAL 2 TIMES DAILY
Status: DISCONTINUED | OUTPATIENT
Start: 2024-10-30 | End: 2024-11-01 | Stop reason: HOSPADM

## 2024-10-30 RX ADMIN — METHOCARBAMOL 750 MG: 750 TABLET ORAL at 12:10

## 2024-10-30 RX ADMIN — MIDAZOLAM HYDROCHLORIDE 2 MG: 2 INJECTION, SOLUTION INTRAMUSCULAR; INTRAVENOUS at 08:10

## 2024-10-30 RX ADMIN — MUPIROCIN: 20 OINTMENT TOPICAL at 07:10

## 2024-10-30 RX ADMIN — CALCIUM CARBONATE (ANTACID) CHEW TAB 500 MG 500 MG: 500 CHEW TAB at 12:10

## 2024-10-30 RX ADMIN — ALUMINUM HYDROXIDE, MAGNESIUM HYDROXIDE, AND SIMETHICONE 30 ML: 200; 200; 20 SUSPENSION ORAL at 11:10

## 2024-10-30 RX ADMIN — HYDROMORPHONE HYDROCHLORIDE 0.2 MG: 1 INJECTION, SOLUTION INTRAMUSCULAR; INTRAVENOUS; SUBCUTANEOUS at 12:10

## 2024-10-30 RX ADMIN — PHENYLEPHRINE HYDROCHLORIDE 0.4 MCG/KG/MIN: 10 INJECTION INTRAVENOUS at 08:10

## 2024-10-30 RX ADMIN — PREGABALIN 75 MG: 75 CAPSULE ORAL at 08:10

## 2024-10-30 RX ADMIN — PHENYLEPHRINE HYDROCHLORIDE 100 MCG: 10 INJECTION INTRAVENOUS at 10:10

## 2024-10-30 RX ADMIN — ZOLPIDEM TARTRATE 5 MG: 5 TABLET ORAL at 09:10

## 2024-10-30 RX ADMIN — CEFAZOLIN 2 G: 2 INJECTION, POWDER, FOR SOLUTION INTRAMUSCULAR; INTRAVENOUS at 03:10

## 2024-10-30 RX ADMIN — DEXMEDETOMIDINE 4 MCG: 100 INJECTION, SOLUTION, CONCENTRATE INTRAVENOUS at 09:10

## 2024-10-30 RX ADMIN — REMIFENTANIL HYDROCHLORIDE 0.2 MCG/KG/MIN: 1 INJECTION, POWDER, LYOPHILIZED, FOR SOLUTION INTRAVENOUS at 08:10

## 2024-10-30 RX ADMIN — METHOCARBAMOL 750 MG: 750 TABLET ORAL at 08:10

## 2024-10-30 RX ADMIN — HYDROMORPHONE HYDROCHLORIDE 0.5 MG: 1 INJECTION, SOLUTION INTRAMUSCULAR; INTRAVENOUS; SUBCUTANEOUS at 12:10

## 2024-10-30 RX ADMIN — CEFAZOLIN 2 G: 2 INJECTION, POWDER, FOR SOLUTION INTRAMUSCULAR; INTRAVENOUS at 09:10

## 2024-10-30 RX ADMIN — HYDROMORPHONE HYDROCHLORIDE 0.5 MG: 1 INJECTION, SOLUTION INTRAMUSCULAR; INTRAVENOUS; SUBCUTANEOUS at 11:10

## 2024-10-30 RX ADMIN — Medication 10 MG: at 09:10

## 2024-10-30 RX ADMIN — METHOCARBAMOL 750 MG: 750 TABLET ORAL at 04:10

## 2024-10-30 RX ADMIN — OXYCODONE HYDROCHLORIDE 10 MG: 10 TABLET ORAL at 03:10

## 2024-10-30 RX ADMIN — DEXMEDETOMIDINE 8 MCG: 100 INJECTION, SOLUTION, CONCENTRATE INTRAVENOUS at 11:10

## 2024-10-30 RX ADMIN — PHENYLEPHRINE HYDROCHLORIDE 200 MCG: 10 INJECTION INTRAVENOUS at 08:10

## 2024-10-30 RX ADMIN — SENNOSIDES AND DOCUSATE SODIUM 2 TABLET: 50; 8.6 TABLET ORAL at 12:10

## 2024-10-30 RX ADMIN — SENNOSIDES AND DOCUSATE SODIUM 2 TABLET: 50; 8.6 TABLET ORAL at 08:10

## 2024-10-30 RX ADMIN — PHENYLEPHRINE HYDROCHLORIDE 100 MCG: 10 INJECTION INTRAVENOUS at 08:10

## 2024-10-30 RX ADMIN — OXYCODONE HYDROCHLORIDE 10 MG: 10 TABLET ORAL at 08:10

## 2024-10-30 RX ADMIN — SUGAMMADEX 200 MG: 100 INJECTION, SOLUTION INTRAVENOUS at 10:10

## 2024-10-30 RX ADMIN — HYDROMORPHONE HYDROCHLORIDE 1 MG: 1 INJECTION, SOLUTION INTRAMUSCULAR; INTRAVENOUS; SUBCUTANEOUS at 12:10

## 2024-10-30 RX ADMIN — Medication 10 MG: at 10:10

## 2024-10-30 RX ADMIN — DEXAMETHASONE SODIUM PHOSPHATE 4 MG: 4 INJECTION, SOLUTION INTRAMUSCULAR; INTRAVENOUS at 09:10

## 2024-10-30 RX ADMIN — LIDOCAINE HYDROCHLORIDE 100 MG: 20 INJECTION, SOLUTION EPIDURAL; INFILTRATION; INTRACAUDAL; PERINEURAL at 08:10

## 2024-10-30 RX ADMIN — ALUMINUM HYDROXIDE, MAGNESIUM HYDROXIDE, AND SIMETHICONE 30 ML: 200; 200; 20 SUSPENSION ORAL at 07:10

## 2024-10-30 RX ADMIN — ONDANSETRON 8 MG: 8 TABLET, ORALLY DISINTEGRATING ORAL at 08:10

## 2024-10-30 RX ADMIN — HYDROMORPHONE HYDROCHLORIDE 1 MG: 1 INJECTION, SOLUTION INTRAMUSCULAR; INTRAVENOUS; SUBCUTANEOUS at 04:10

## 2024-10-30 RX ADMIN — SODIUM CHLORIDE, SODIUM GLUCONATE, SODIUM ACETATE, POTASSIUM CHLORIDE, MAGNESIUM CHLORIDE, SODIUM PHOSPHATE, DIBASIC, AND POTASSIUM PHOSPHATE: .53; .5; .37; .037; .03; .012; .00082 INJECTION, SOLUTION INTRAVENOUS at 08:10

## 2024-10-30 RX ADMIN — ONDANSETRON 4 MG: 2 INJECTION INTRAMUSCULAR; INTRAVENOUS at 11:10

## 2024-10-30 RX ADMIN — ROCURONIUM BROMIDE 20 MG: 10 INJECTION, SOLUTION INTRAVENOUS at 09:10

## 2024-10-30 RX ADMIN — Medication 20 MG: at 08:10

## 2024-10-30 RX ADMIN — PHENYLEPHRINE HYDROCHLORIDE 50 MCG: 10 INJECTION INTRAVENOUS at 10:10

## 2024-10-30 RX ADMIN — DEXMEDETOMIDINE 4 MCG: 100 INJECTION, SOLUTION, CONCENTRATE INTRAVENOUS at 10:10

## 2024-10-30 RX ADMIN — MUPIROCIN: 20 OINTMENT TOPICAL at 08:10

## 2024-10-30 RX ADMIN — ACETAMINOPHEN 1000 MG: 500 TABLET ORAL at 08:10

## 2024-10-30 RX ADMIN — PROPOFOL 150 MG: 10 INJECTION, EMULSION INTRAVENOUS at 08:10

## 2024-10-30 RX ADMIN — FENTANYL CITRATE 100 MCG: 50 INJECTION, SOLUTION INTRAMUSCULAR; INTRAVENOUS at 08:10

## 2024-10-30 RX ADMIN — ROCURONIUM BROMIDE 30 MG: 10 INJECTION, SOLUTION INTRAVENOUS at 09:10

## 2024-10-30 RX ADMIN — PROPOFOL 100 MCG/KG/MIN: 10 INJECTION, EMULSION INTRAVENOUS at 08:10

## 2024-10-30 RX ADMIN — PHENYLEPHRINE HYDROCHLORIDE 100 MCG: 10 INJECTION INTRAVENOUS at 09:10

## 2024-10-30 RX ADMIN — SUCCINYLCHOLINE 140 MG: 20 INJECTION, SOLUTION INTRAMUSCULAR; INTRAVENOUS at 08:10

## 2024-10-30 RX ADMIN — OXYCODONE HYDROCHLORIDE 10 MG: 10 TABLET ORAL at 12:10

## 2024-10-30 RX ADMIN — CALCIUM CARBONATE (ANTACID) CHEW TAB 500 MG 500 MG: 500 CHEW TAB at 08:10

## 2024-10-30 RX ADMIN — ACETAMINOPHEN 1000 MG: 10 INJECTION INTRAVENOUS at 11:10

## 2024-10-30 NOTE — BRIEF OP NOTE
[facility] - Surgery ([facility])  Brief Operative Note    SUMMARY     Surgery Date: 10/30/2024     Surgeons and Role:     * Dawit Carlton MD - Primary     * Julito Mathur MD - Assisting        Pre-op Diagnosis:  Spondylosis with myelopathy, thoracic region [M47.14]    Post-op Diagnosis:  Post-Op Diagnosis Codes:     * Spondylosis with myelopathy, thoracic region [M47.14]    Procedure(s) (LRB):  T8-T11 Laminectomy and posterior instrumented fusion (N/A)    Anesthesia: General    Implants:  Implant Name Type Inv. Item Serial No.  Lot No. LRB No. Used Action   SCREW TI EXP VERSE 5.5 5X35MM - [id number]  SCREW TI EXP VERSE 5.5 5X35MM  [employer]  N/A 4 Implanted   SCREW POLYAXIAL 5X35MM - [id number]  SCREW POLYAXIAL 5X35MM  [employer]  N/A 4 Implanted   SET SCREW EXPEDIUM VERSE UNITZ - [id number]  SET SCREW EXPEDIUM VERSE UNITZ  [employer]  N/A 4 Implanted   SCREW INNER SINGLE SET TITANIU - [id number]  SCREW INNER SINGLE SET TITANIU  [employer]  N/A 4 Implanted   MARCI SPINE MTRX STR [id number] - [id number]  MAR[employer] SPINE MTRX STR [id number]  [employer]  N/A 2 Implanted       Operative Findings: T8-T10 laminectomy and facetectomies and T8-T11 fusion    Estimated Blood Loss: * No values recorded between 10/30/2024  9:34 AM and 10/30/2024 12:00 PM *    Estimated Blood Loss has been documented.         Specimens:   Specimen (24h ago, onward)      None            [id number]

## 2024-10-30 NOTE — HPI
HPI from 7/10/2024:  Orlin Mayo is a 63 y.o. male who presents with a very complicated history he has had multiple spine surgeries including a remote ACDF with a adjacent segment disease slight kyphosis and a spondylolisthesis with moderate stenosis, he has no cord compression or cord signal change, he denies any new upper extremity symptoms.  Functionally stable.  He will get some intermittent paresthesias in the right hand but this is chronic nothing new.     With his regard to his lumbar spine in [place] in April he had a revision surgery and which sounds like an extension of fusion proximally and distally which would now be a fusion from L2-S1 this included an JJ at L5-S1.  He states that he had the surgery for back but right leg pain and paresthesias involving the right foot.  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  He really does not feel like the surgery improved his symptoms.  But no worse  I have no postoperative imaging.     I he did have known proximal mild moderate scoliosis, as well as extensive thoracic degeneration with stenosis what appears to be some myelomalacia.  He states this was known preoperatively he saw 2 surgical opinions had surgery in [place] proceeded with the lumbar surgery.  He has no rapid deterioration but he does have chronic thoracic myelopathy with some imbalance he does feel like this limits his walking and coordination     And lastly 3 weeks ago he underwent a knee replacement with Dr. Acosta which he is actively recovering with no complications, and with physical therapy.     Interval Hx:   Today the patient reports that his main concern is low back pain and burning and paraesthesias in his feet, with the right side worse than left. He states that the back pain gets worse as the day goes on. Until recently, his foot symptoms were more concerning but now his lower back pain seems to be present more frequently. He says he is having increased balance  issues. He states that he has increased urinary frequency over the past few months. He also reports dropping things with his hands and some occasional numbness in his right hand. He has a back brace that he wears occasionally.      Interval historty 10/30/24: presents for surgery

## 2024-10-30 NOTE — PLAN OF CARE
Problem: Adult Inpatient Plan of Care  Goal: Plan of Care Review  Outcome: Progressing  Goal: Patient-Specific Goal (Individualized)  Outcome: Progressing  Goal: Absence of Hospital-Acquired Illness or Injury  Outcome: Progressing  Goal: Optimal Comfort and Wellbeing  Outcome: Progressing  Goal: Readiness for Transition of Care  Outcome: Progressing     Problem: Wound  Goal: Optimal Coping  Outcome: Progressing  Goal: Optimal Functional Ability  Outcome: Progressing  Goal: Absence of Infection Signs and Symptoms  Outcome: Progressing  Goal: Improved Oral Intake  Outcome: Progressing  Goal: Optimal Pain Control and Function  Outcome: Progressing  Goal: Skin Health and Integrity  Outcome: Progressing  Goal: Optimal Wound Healing  Outcome: Progressing     Problem: Skin Injury Risk Increased  Goal: Skin Health and Integrity  Outcome: Progressing    Patient is awake and alert, MAEW, follows commands. Patient states pain 9/10 after Oxycodone 10mg, 1mg IVP Dilaudid given with with Robaxin PO. Will reassess pain. Patient positioned on pillow for support. IS at bedside, patient performs independently. Mid back incision intact with Hemovac drains x2 to suction with sanguineous output. Patient is tolerating PO. Patient is due to void. POC discussed with patient at bedside, all questions asked and answered, no concerns identified. POC continues as ordered. Bed remains in low position, call light and personal belongings in reach. Will continue to monitor. All care and support provided.

## 2024-10-30 NOTE — PLAN OF CARE
Chart reviewed. Pre-op nursing care per orders. Safe surgery checklist complete. Family not present at bedside. Patient belongings in dosc locker.

## 2024-10-30 NOTE — ASSESSMENT & PLAN NOTE
Orlin Mayo has progressive thoracic myelopathy marked by gait spasticity and ataxia, increased urinary frequency, BLE weakness, and BLE dysesthesias. 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 I have recommended a T8-11 laminectomy, facetectomy and posterior instrumented fusion. R/B/A/I/M were reviewed in detail and he wished to proceed. I highlighted the possible risk of progression of his moderate proximal junctional scoliosis above his L2-S1 fusion and the possible future need to extend this construct to the distal hardware. He still wished to proceed        Proceed to OR for T8 to T11 laminectomy and instrumented fusion

## 2024-10-30 NOTE — H&P
[facility]a - Surgery ([facility])  Neuorsurgery  History and Physical     Patient Name: Orlin Mayo  MRN: [id number]  Admission Date: 10/30/2024  Attending Physician: Dawit Cralton MD   Primary Care Physician: Fátima, Primary Doctor    Patient information was obtained from patient and ER records.     Subjective:     Chief Complaint/Reason for Admission: thoracic myelopathy     HPI:  HPI from 7/10/2024:  Orlin Mayo is a 63 y.o. male who presents with a very complicated history he has had multiple spine surgeries including a remote ACDF with a adjacent segment disease slight kyphosis and a spondylolisthesis with moderate stenosis, he has no cord compression or cord signal change, he denies any new upper extremity symptoms.  Functionally stable.  He will get some intermittent paresthesias in the right hand but this is chronic nothing new.     With his regard to his lumbar spine in [place] in April he had a revision surgery and which sounds like an extension of fusion proximally and distally which would now be a fusion from L2-S1 this included an JJ at L5-S1.  He states that he had the surgery for back but right leg pain and paresthesias involving the right foot.  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  He really does not feel like the surgery improved his symptoms.  But no worse  I have no postoperative imaging.     I he did have known proximal mild moderate scoliosis, as well as extensive thoracic degeneration with stenosis what appears to be some myelomalacia.  He states this was known preoperatively he saw 2 surgical opinions had surgery in [place] proceeded with the lumbar surgery.  He has no rapid deterioration but he does have chronic thoracic myelopathy with some imbalance he does feel like this limits his walking and coordination     And lastly 3 weeks ago he underwent a knee replacement with Dr. Acosta which he is actively recovering with no complications, and with physical therapy.      Interval Hx:   Today the patient reports that his main concern is low back pain and burning and paraesthesias in his feet, with the right side worse than left. He states that the back pain gets worse as the day goes on. Until recently, his foot symptoms were more concerning but now his lower back pain seems to be present more frequently. He says he is having increased balance issues. He states that he has increased urinary frequency over the past few months. He also reports dropping things with his hands and some occasional numbness in his right hand. He has a back brace that he wears occasionally.      Interval historty 10/30/24: presents for surgery    No medications prior to admission.       Review of patient's allergies indicates:   Allergen Reactions    Animal dander     Penicillins     Pollen extracts        Past Medical History:   Diagnosis Date    Acute asthma exacerbation 02/05/2021    Anemia     Anxiety     Asthma     Cervical spondylosis 10/14/2016    Chronic back pain     COPD (chronic obstructive pulmonary disease)     Depression     GERD (gastroesophageal reflux disease)     History of prosthetic unicompartmental arthroplasty of right knee 02/24/2020    Hyperlipidemia     Hypertension 08/15/2016    Major depressive disorder, recurrent episode, mild 02/07/2008    Mild intermittent asthma, uncomplicated 07/19/2018     Past Surgical History:   Procedure Laterality Date    ARTHROSCOPIC REPAIR OF ROTATOR CUFF OF SHOULDER Right 08/01/2019    COLONOSCOPY      HERNIA REPAIR      JOINT REPLACEMENT      left shoulder surgery      LUMBAR FUSION      x2    REPAIR OF TRICEPS TENDON Right     3 surgeries    REPAIR OF TRICEPS TENDON Left     x1    right knee tkr Right     ROTATOR CUFF REPAIR Bilateral     2 on right , 1 to left    UNICOMPARTMENTAL ARTHROPLASTY OF KNEE Right      Family History       Problem Relation (Age of Onset)    Cancer Mother, Father    Hypertension Mother, Father          Tobacco Use     Smoking status: Never    Smokeless tobacco: Never   Substance and Sexual Activity    Alcohol use: Yes     Comment: Has 2 drinks 2-3 each night wine bourbon and teisted tea    Drug use: Yes     Types: Marijuana     Comment: twice per week; uses edibles and vap pen    Sexual activity: Yes     Review of Systems  Objective:        There is no height or weight on file to calculate BMI.  Vital Signs (Most Recent):    Vital Signs (24h Range):                                    Physical Exam         Neurosurgery Physical Exam    Vital signs: All nursing notes and vital signs reviewed -- afebrile, vital signs stable.  Constitutional: Patient sitting comfortably in chair. Appears well developed and well nourished.  Skin: Exposed areas are intact without abnormal markings, rashes or other lesions. Well-healed abdominal incision and two well-healed paraspinal incisions.   HEENT: Normocephalic. Normal conjunctivae.  Cardiovascular: Normal rate and regular rhythm.  Respiratory: Chest wall rises and falls symmetrically, without signs of respiratory distress.  Abdomen: Soft and non-tender.  Extremities: Warm and without edema. Calves supple, non-tender.  Psych/Behavior: Normal affect.     Neurological:     Mental status: Alert and oriented. Conversational and appropriate.       Cranial Nerves: VFF to confrontation. PERRL. EOMI without nystagmus. Facial STLT normal and symmetric. Strong, symmetric muscles of mastication. Facial strength full and symmetric. Hearing equal bilaterally to finger rub. Palate and uvula rise and fall normally in midline. Shoulder shrug 5/5 strength. Tongue midline.      Motor:     Upper:   Deltoids Triceps Biceps WE WF      R 5/5 5/5 5/5 5/5 5/5 5/5     L 5/5 5/5 5/5 5/5 5/5 5/5      Lower:   HF KE KF DF PF EHL     R 5/5 5/5 5/5 3/5 3/5 3/5     L 5/5 5/5 5/5 2/5 3/5 3/5      Bilateral quadriceps atrophy, right more than left.      Sensory: Intact sensation to light touch in all extremities. Romberg  "positive.     Reflexes:          DTR: 2+ symmetrically throughout.     Johnson's: Negative.     Babinski's: Negative.     Clonus: Negative.     2+ left knee reflex. Had recent knee replacement on right side.      Cerebellar: Finger-to-nose and rapid alternating movements normal. Spastic gait.      Spine:     Posture: Head well aligned over pelvis in front and side views.  No focal or global spinal deformity visible on inspection. Shoulders and hips even. No obvious leg length discrepancy. No scapula winging.     Bending: Full ROM with forward, back and lateral bending. No rib prominence with forward bend.     Cervical:      ROM: Full with flexion, extension, lateral rotation and ear-to-shoulder bend.      Midline TTP: Negative.     Spurling's test: Negative.     Lhermitte's: Negative.     Thoracic:     Midline TTP: Negative     Lumbar:     Midline TTP: Negative     Straight Leg Test: Negative     Crossed Straight Leg Test: Negative     Sciatic notch tenderness: Negative.     Other:     SI joint TTP: Negative.     Greater trochanter TTP: Negative.     Tenderness with external/internal hip rotation: Negative.    Significant Labs:  No results for input(s): "GLU", "NA", "K", "CL", "CO2", "BUN", "CREATININE", "CALCIUM", "MG" in the last 48 hours.  No results for input(s): "WBC", "HGB", "HCT", "PLT" in the last 48 hours.  No results for input(s): "LABPT", "INR", "APTT" in the last 48 hours.  Microbiology Results (last 7 days)       ** No results found for the last 168 hours. **          All pertinent labs from the last 24 hours have been reviewed.    Significant Diagnostics:  I have reviewed all pertinent imaging results/findings within the past 24 hours.  Assessment and Plan:     * Thoracic myelopathy  Orlin Mayo has progressive thoracic myelopathy marked by gait spasticity and ataxia, increased urinary frequency, BLE weakness, and BLE dysesthesias. 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 I have recommended a T8-11 laminectomy, facetectomy and posterior instrumented fusion. R/B/A/I/M were reviewed in detail and he wished to proceed. I highlighted the possible risk of progression of his moderate proximal junctional scoliosis above his L2-S1 fusion and the possible future need to extend this construct to the distal hardware. He still wished to proceed        Proceed to OR for T8 to T11 laminectomy and instrumented fusion        Julito Mathur MD  Neurosurgery  [facility] - Surgery (2nd Fl)

## 2024-10-30 NOTE — NURSING TRANSFER
Nursing Transfer Note      10/30/2024   1:50 PM    Nurse giving handoff:Raisa STROUD PACU RN  Nurse receiving handoff:Tiesha    Reason patient is being transferred: post procedure    Transfer To: [place] [id number]    Transfer via bed    Transfer with N/A    Transported by transport    Order for Tele Monitor? No    Patient belongings transferred with patient: Yes    Chart send with patient: Yes    Notified: sister    Patient reassessed at: 1350

## 2024-10-30 NOTE — SUBJECTIVE & OBJECTIVE
No medications prior to admission.       Review of patient's allergies indicates:   Allergen Reactions    Animal dander     Penicillins     Pollen extracts        Past Medical History:   Diagnosis Date    Acute asthma exacerbation 02/05/2021    Anemia     Anxiety     Asthma     Cervical spondylosis 10/14/2016    Chronic back pain     COPD (chronic obstructive pulmonary disease)     Depression     GERD (gastroesophageal reflux disease)     History of prosthetic unicompartmental arthroplasty of right knee 02/24/2020    Hyperlipidemia     Hypertension 08/15/2016    Major depressive disorder, recurrent episode, mild 02/07/2008    Mild intermittent asthma, uncomplicated 07/19/2018     Past Surgical History:   Procedure Laterality Date    ARTHROSCOPIC REPAIR OF ROTATOR CUFF OF SHOULDER Right 08/01/2019    COLONOSCOPY      HERNIA REPAIR      JOINT REPLACEMENT      left shoulder surgery      LUMBAR FUSION      x2    REPAIR OF TRICEPS TENDON Right     3 surgeries    REPAIR OF TRICEPS TENDON Left     x1    right knee tkr Right     ROTATOR CUFF REPAIR Bilateral     2 on right , 1 to left    UNICOMPARTMENTAL ARTHROPLASTY OF KNEE Right      Family History       Problem Relation (Age of Onset)    Cancer Mother, Father    Hypertension Mother, Father          Tobacco Use    Smoking status: Never    Smokeless tobacco: Never   Substance and Sexual Activity    Alcohol use: Yes     Comment: Has 2 drinks 2-3 each night wine bourbon and teisted tea    Drug use: Yes     Types: Marijuana     Comment: twice per week; uses edibles and vap pen    Sexual activity: Yes     Review of Systems  Objective:        There is no height or weight on file to calculate BMI.  Vital Signs (Most Recent):    Vital Signs (24h Range):                                    Physical Exam         Neurosurgery Physical Exam    Vital signs: All nursing notes and vital signs reviewed -- afebrile, vital signs stable.  Constitutional: Patient sitting comfortably in chair.  Appears well developed and well nourished.  Skin: Exposed areas are intact without abnormal markings, rashes or other lesions. Well-healed abdominal incision and two well-healed paraspinal incisions.   HEENT: Normocephalic. Normal conjunctivae.  Cardiovascular: Normal rate and regular rhythm.  Respiratory: Chest wall rises and falls symmetrically, without signs of respiratory distress.  Abdomen: Soft and non-tender.  Extremities: Warm and without edema. Calves supple, non-tender.  Psych/Behavior: Normal affect.     Neurological:     Mental status: Alert and oriented. Conversational and appropriate.       Cranial Nerves: VFF to confrontation. PERRL. EOMI without nystagmus. Facial STLT normal and symmetric. Strong, symmetric muscles of mastication. Facial strength full and symmetric. Hearing equal bilaterally to finger rub. Palate and uvula rise and fall normally in midline. Shoulder shrug 5/5 strength. Tongue midline.      Motor:     Upper:   Deltoids Triceps Biceps WE WF      R 5/5 5/5 5/5 5/5 5/5 5/5     L 5/5 5/5 5/5 5/5 5/5 5/5      Lower:   HF KE KF DF PF EHL     R 5/5 5/5 5/5 3/5 3/5 3/5     L 5/5 5/5 5/5 2/5 3/5 3/5      Bilateral quadriceps atrophy, right more than left.      Sensory: Intact sensation to light touch in all extremities. Romberg positive.     Reflexes:          DTR: 2+ symmetrically throughout.     Johnson's: Negative.     Babinski's: Negative.     Clonus: Negative.     2+ left knee reflex. Had recent knee replacement on right side.      Cerebellar: Finger-to-nose and rapid alternating movements normal. Spastic gait.      Spine:     Posture: Head well aligned over pelvis in front and side views.  No focal or global spinal deformity visible on inspection. Shoulders and hips even. No obvious leg length discrepancy. No scapula winging.     Bending: Full ROM with forward, back and lateral bending. No rib prominence with forward bend.     Cervical:      ROM: Full with flexion, extension, lateral  "rotation and ear-to-shoulder bend.      Midline TTP: Negative.     Spurling's test: Negative.     Lhermitte's: Negative.     Thoracic:     Midline TTP: Negative     Lumbar:     Midline TTP: Negative     Straight Leg Test: Negative     Crossed Straight Leg Test: Negative     Sciatic notch tenderness: Negative.     Other:     SI joint TTP: Negative.     Greater trochanter TTP: Negative.     Tenderness with external/internal hip rotation: Negative.    Significant Labs:  No results for input(s): "GLU", "NA", "K", "CL", "CO2", "BUN", "CREATININE", "CALCIUM", "MG" in the last 48 hours.  No results for input(s): "WBC", "HGB", "HCT", "PLT" in the last 48 hours.  No results for input(s): "LABPT", "INR", "APTT" in the last 48 hours.  Microbiology Results (last 7 days)       ** No results found for the last 168 hours. **          All pertinent labs from the last 24 hours have been reviewed.    Significant Diagnostics:  I have reviewed all pertinent imaging results/findings within the past 24 hours.  " yes

## 2024-10-31 LAB
ANION GAP SERPL CALC-SCNC: 11 MMOL/L (ref 8–16)
BASOPHILS # BLD AUTO: 0.03 K/UL (ref 0–0.2)
BASOPHILS NFR BLD: 0.3 % (ref 0–1.9)
BUN SERPL-MCNC: 14 MG/DL (ref 8–23)
CALCIUM SERPL-MCNC: 8.7 MG/DL (ref 8.7–10.5)
CHLORIDE SERPL-SCNC: 97 MMOL/L (ref 95–110)
CO2 SERPL-SCNC: 29 MMOL/L (ref 23–29)
CREAT SERPL-MCNC: 0.7 MG/DL (ref 0.5–1.4)
DIFFERENTIAL METHOD BLD: ABNORMAL
EOSINOPHIL # BLD AUTO: 0 K/UL (ref 0–0.5)
EOSINOPHIL NFR BLD: 0.1 % (ref 0–8)
ERYTHROCYTE [DISTWIDTH] IN BLOOD BY AUTOMATED COUNT: 12.3 % (ref 11.5–14.5)
EST. GFR  (NO RACE VARIABLE): >60 ML/MIN/1.73 M^2
GLUCOSE SERPL-MCNC: 108 MG/DL (ref 70–110)
HCT VFR BLD AUTO: 31.6 % (ref 40–54)
HGB BLD-MCNC: 10.9 G/DL (ref 14–18)
IMM GRANULOCYTES # BLD AUTO: 0.04 K/UL (ref 0–0.04)
IMM GRANULOCYTES NFR BLD AUTO: 0.4 % (ref 0–0.5)
LYMPHOCYTES # BLD AUTO: 1.6 K/UL (ref 1–4.8)
LYMPHOCYTES NFR BLD: 15.4 % (ref 18–48)
MCH RBC QN AUTO: 31.1 PG (ref 27–31)
MCHC RBC AUTO-ENTMCNC: 34.5 G/DL (ref 32–36)
MCV RBC AUTO: 90 FL (ref 82–98)
MONOCYTES # BLD AUTO: 1 K/UL (ref 0.3–1)
MONOCYTES NFR BLD: 10.3 % (ref 4–15)
NEUTROPHILS # BLD AUTO: 7.4 K/UL (ref 1.8–7.7)
NEUTROPHILS NFR BLD: 73.5 % (ref 38–73)
NRBC BLD-RTO: 0 /100 WBC
PLATELET # BLD AUTO: 199 K/UL (ref 150–450)
PMV BLD AUTO: 10.8 FL (ref 9.2–12.9)
POTASSIUM SERPL-SCNC: 2.9 MMOL/L (ref 3.5–5.1)
RBC # BLD AUTO: 3.5 M/UL (ref 4.6–6.2)
SODIUM SERPL-SCNC: 137 MMOL/L (ref 136–145)
WBC # BLD AUTO: 10.04 K/UL (ref 3.9–12.7)

## 2024-10-31 PROCEDURE — 94761 N-INVAS EAR/PLS OXIMETRY MLT: CPT

## 2024-10-31 PROCEDURE — 25000003 PHARM REV CODE 250: Performed by: STUDENT IN AN ORGANIZED HEALTH CARE EDUCATION/TRAINING PROGRAM

## 2024-10-31 PROCEDURE — 97165 OT EVAL LOW COMPLEX 30 MIN: CPT

## 2024-10-31 PROCEDURE — 97535 SELF CARE MNGMENT TRAINING: CPT

## 2024-10-31 PROCEDURE — 85025 COMPLETE CBC W/AUTO DIFF WBC: CPT | Performed by: STUDENT IN AN ORGANIZED HEALTH CARE EDUCATION/TRAINING PROGRAM

## 2024-10-31 PROCEDURE — 11000001 HC ACUTE MED/SURG PRIVATE ROOM

## 2024-10-31 PROCEDURE — 63600175 PHARM REV CODE 636 W HCPCS: Performed by: STUDENT IN AN ORGANIZED HEALTH CARE EDUCATION/TRAINING PROGRAM

## 2024-10-31 PROCEDURE — 36415 COLL VENOUS BLD VENIPUNCTURE: CPT | Performed by: STUDENT IN AN ORGANIZED HEALTH CARE EDUCATION/TRAINING PROGRAM

## 2024-10-31 PROCEDURE — 99024 POSTOP FOLLOW-UP VISIT: CPT | Mod: POP,,, | Performed by: PHYSICIAN ASSISTANT

## 2024-10-31 PROCEDURE — 99900035 HC TECH TIME PER 15 MIN (STAT)

## 2024-10-31 PROCEDURE — 80048 BASIC METABOLIC PNL TOTAL CA: CPT | Performed by: STUDENT IN AN ORGANIZED HEALTH CARE EDUCATION/TRAINING PROGRAM

## 2024-10-31 RX ADMIN — ACETAMINOPHEN 1000 MG: 500 TABLET ORAL at 05:10

## 2024-10-31 RX ADMIN — METHOCARBAMOL 750 MG: 750 TABLET ORAL at 09:10

## 2024-10-31 RX ADMIN — OXYCODONE HYDROCHLORIDE 10 MG: 10 TABLET ORAL at 12:10

## 2024-10-31 RX ADMIN — METHOCARBAMOL 750 MG: 750 TABLET ORAL at 08:10

## 2024-10-31 RX ADMIN — ACETAMINOPHEN 1000 MG: 500 TABLET ORAL at 09:10

## 2024-10-31 RX ADMIN — LOSARTAN POTASSIUM 25 MG: 25 TABLET, FILM COATED ORAL at 08:10

## 2024-10-31 RX ADMIN — SENNOSIDES AND DOCUSATE SODIUM 2 TABLET: 50; 8.6 TABLET ORAL at 08:10

## 2024-10-31 RX ADMIN — PREGABALIN 75 MG: 75 CAPSULE ORAL at 08:10

## 2024-10-31 RX ADMIN — METHOCARBAMOL 750 MG: 750 TABLET ORAL at 01:10

## 2024-10-31 RX ADMIN — ZOLPIDEM TARTRATE 5 MG: 5 TABLET ORAL at 10:10

## 2024-10-31 RX ADMIN — ACETAMINOPHEN 1000 MG: 500 TABLET ORAL at 01:10

## 2024-10-31 RX ADMIN — CEFAZOLIN 2 G: 2 INJECTION, POWDER, FOR SOLUTION INTRAMUSCULAR; INTRAVENOUS at 11:10

## 2024-10-31 RX ADMIN — MUPIROCIN: 20 OINTMENT TOPICAL at 08:10

## 2024-10-31 RX ADMIN — SENNOSIDES AND DOCUSATE SODIUM 2 TABLET: 50; 8.6 TABLET ORAL at 09:10

## 2024-10-31 RX ADMIN — OXYCODONE HYDROCHLORIDE 10 MG: 10 TABLET ORAL at 02:10

## 2024-10-31 RX ADMIN — CALCIUM CARBONATE (ANTACID) CHEW TAB 500 MG 500 MG: 500 CHEW TAB at 09:10

## 2024-10-31 RX ADMIN — CEFAZOLIN 2 G: 2 INJECTION, POWDER, FOR SOLUTION INTRAMUSCULAR; INTRAVENOUS at 04:10

## 2024-10-31 RX ADMIN — CEFAZOLIN 2 G: 2 INJECTION, POWDER, FOR SOLUTION INTRAMUSCULAR; INTRAVENOUS at 12:10

## 2024-10-31 RX ADMIN — OXYCODONE 5 MG: 5 TABLET ORAL at 06:10

## 2024-10-31 RX ADMIN — CEFAZOLIN 2 G: 2 INJECTION, POWDER, FOR SOLUTION INTRAMUSCULAR; INTRAVENOUS at 08:10

## 2024-10-31 RX ADMIN — OXYCODONE HYDROCHLORIDE 10 MG: 10 TABLET ORAL at 10:10

## 2024-10-31 RX ADMIN — CALCIUM CARBONATE (ANTACID) CHEW TAB 500 MG 500 MG: 500 CHEW TAB at 08:10

## 2024-10-31 RX ADMIN — METHOCARBAMOL 750 MG: 750 TABLET ORAL at 04:10

## 2024-10-31 RX ADMIN — MUPIROCIN: 20 OINTMENT TOPICAL at 09:10

## 2024-10-31 NOTE — SUBJECTIVE & OBJECTIVE
Interval History: POD 1 T8-T11 laminectomies and fusion. Patient with pain controlled currently. HV drains with 60/5 cc output, likely remove tomorrow. Up with PT/OT. XR completed, hardware in good position.     Medications:  Continuous Infusions:  Scheduled Meds:   acetaminophen  1,000 mg Oral Q8H    calcium carbonate  500 mg Oral BID    ceFAZolin (Ancef) IV (PEDS and ADULTS)  2 g Intravenous Q8H    losartan  25 mg Oral Daily    methocarbamoL  750 mg Oral QID    mupirocin   Nasal BID    pregabalin  75 mg Oral BID    senna-docusate 8.6-50 mg  2 tablet Oral BID     PRN Meds:  Current Facility-Administered Medications:     aluminum-magnesium hydroxide-simethicone, 30 mL, Oral, Q4H PRN    dextrose 10%, 12.5 g, Intravenous, PRN    dextrose 10%, 25 g, Intravenous, PRN    diphenhydrAMINE, 25 mg, Oral, Q6H PRN    HYDROmorphone, 1 mg, Intravenous, Q4H PRN    ondansetron, 8 mg, Oral, Q6H PRN    oxyCODONE, 5 mg, Oral, Q4H PRN    oxyCODONE, 10 mg, Oral, Q4H PRN    prochlorperazine, 5 mg, Intravenous, Q6H PRN    zolpidem, 5 mg, Oral, Nightly PRN     Review of Systems  Objective:     Weight: 72.5 kg (159 lb 13.3 oz)  Body mass index is 24.3 kg/m².  Vital Signs (Most Recent):  Temp: 97.1 °F (36.2 °C) (10/31/24 0730)  Pulse: 80 (10/31/24 0730)  Resp: 18 (10/31/24 1036)  BP: 109/64 (10/31/24 0730)  SpO2: 97 % (10/31/24 0730) Vital Signs (24h Range):  Temp:  [97.1 °F (36.2 °C)-98.5 °F (36.9 °C)] 97.1 °F (36.2 °C)  Pulse:  [72-88] 80  Resp:  [12-23] 18  SpO2:  [96 %-100 %] 97 %  BP: (103-129)/(58-74) 109/64     Date 10/31/24 0700 - 11/01/24 0659   Shift 9530-31723244 8165-5503 5020-0659 24 Hour Total   INTAKE   P.O. 300   300   Shift Total(mL/kg) 300(4.1)   300(4.1)   OUTPUT   Drains 10   10   Shift Total(mL/kg) 10(0.1)   10(0.1)   Weight (kg) 72.5 72.5 72.5 72.5                            Closed/Suction Drain 10/30/24 1118 Medial;Right Back Accordion 10 Fr. (Active)   Site Description Unable to view 10/31/24 0730   Dressing Type Gauze  "10/31/24 0730   Dressing Status Clean;Dry;Intact 10/31/24 0730   Dressing Intervention Integrity maintained 10/31/24 0730   Drainage Serosanguineous 10/31/24 0730   Status To bulb suction 10/31/24 0730   Output (mL) 5 mL 10/31/24 0833            Closed/Suction Drain 10/30/24 1121 Medial;Left Back Accordion 10 Fr. (Active)   Site Description Unable to view 10/31/24 0730   Dressing Type Gauze 10/31/24 0730   Dressing Status Clean;Dry;Intact 10/31/24 0730   Dressing Intervention Integrity maintained 10/31/24 0730   Drainage Serosanguineous 10/31/24 0730   Status To bulb suction 10/31/24 0730   Output (mL) 5 mL 10/31/24 0833         Neurosurgery Physical Exam  General: well developed, well nourished, no distress.   Head: normocephalic, atraumatic  Neurologic: Alert and oriented. Thought content appropriate.  Cranial nerves: face symmetric  Eyes: EOMI.   Pulmonary: no signs of respiratory distress  Skin: Skin is warm, dry and intact.  Sensory: intact to light touch throughout  Motor Strength. No abnormal movements seen.     Strength  Deltoids Triceps Biceps Wrist Extension Wrist Flexion Hand    Upper: R 5/5 5/5 5/5 5/5 5/5 5/5    L 5/5 5/5 5/5 5/5 5/5 5/5     Iliopsoas Quadriceps Knee  Flexion Tibialis  anterior Gastro- cnemius EHL   Lower: R 5/5 5/5 5/5 5/5 5/5 5/5    L 5/5 5/5 5/5 2/5 2/5 1/5     Surgical dressing C/D/I with HV x 2 to full suction with SS output        Significant Labs:  Recent Labs   Lab 10/31/24  0329         K 2.9*   CL 97   CO2 29   BUN 14   CREATININE 0.7   CALCIUM 8.7     Recent Labs   Lab 10/31/24  0329   WBC 10.04   HGB 10.9*   HCT 31.6*        No results for input(s): "LABPT", "INR", "APTT" in the last 48 hours.  Microbiology Results (last 7 days)       ** No results found for the last 168 hours. **          All pertinent labs from the last 24 hours have been reviewed.    Significant Diagnostics:  I have reviewed all pertinent imaging results/findings within the past 24 " hours.

## 2024-10-31 NOTE — PLAN OF CARE
OT eval complete. OT POC and goals established.  Problem: Occupational Therapy  Goal: Occupational Therapy Goal  Description: Goals to be met by: 11/30/24     Patient will increase functional independence with ADLs by performing:    UE Dressing with Modified [place].  LE Dressing with Modified [place].  Grooming while standing at sink with Modified [place].  Toileting from toilet/bedside commode with Modified [place] for hygiene and clothing management.   Toilet transfer to toilet/bedside commode with Modified [place].    Outcome: Progressing

## 2024-10-31 NOTE — HOSPITAL COURSE
10/31/2024: POD 1 T8-T11 laminectomies and fusion. Patient with pain controlled currently. HV drains with 60/5 cc output, likely remove tomorrow. Up with PT/OT. XR completed, hardware in good position.   11/1: NAEON. Pain well controlled. Drains removed without issue. Voiding spontaneously. Stable for dc home with home health. Dc instructions discussed with patient he voiced understanding. All of his questions were answered.    Exam day of discharge:  General: well developed, well nourished, no distress.   Head: normocephalic, atraumatic  Neurologic: Alert and oriented. Thought content appropriate.  Cranial nerves: face symmetric  Eyes: EOMI.   Pulmonary: no signs of respiratory distress  Skin: Skin is warm, dry and intact.  Sensory: intact to light touch throughout  Motor Strength. No abnormal movements seen.      Strength   Deltoids Triceps Biceps Wrist Extension Wrist Flexion Hand    Upper: R 5/5 5/5 5/5 5/5 5/5 5/5     L 5/5 5/5 5/5 5/5 5/5 5/5       Iliopsoas Quadriceps Knee  Flexion Tibialis  anterior Gastro- cnemius EHL   Lower: R 5/5 5/5 5/5 5/5 5/5 5/5     L 5/5 5/5 5/5 2/5 2/5 1/5      Surgical incision: staples intact with skin edges well aligned. No surrounding erythema or edema

## 2024-10-31 NOTE — ASSESSMENT & PLAN NOTE
Orlin Mayo has progressive thoracic myelopathy marked by gait spasticity and ataxia, increased urinary frequency, BLE weakness, and BLE dysesthesias. 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 I have recommended a T8-11 laminectomy, facetectomy and posterior instrumented fusion. R/B/A/I/M were reviewed in detail and he wished to proceed. I highlighted the possible risk of progression of his moderate proximal junctional scoliosis above his L2-S1 fusion and the possible future need to extend this construct to the distal hardware. He still wished to proceed        Now s/p T8 to T11 laminectomy and instrumented fusion with Dr. Carlton on 10/30/24    -Neurologically stable   -monitor Q4H  -All pertinent labs and imaging reviewed   -post op XR with hardware in good place  -Multimodal pain regimen   -Maintain drains. Empty and record q4h. Prophylactic abx while drain in place  -Brace on when upright, OOB, or working with therapy. OK to remove when lying in bed  -Voiding spontaneously. Bladder scan q6h PRN urinary retention. In/Out cath for > 300 cc  -OOB. PT/OT  -DVT prophylaxis: SCD's, LVX  -Bowel regimen: senna BID and miralax daily  -Atelectasis prevention: IS hourly while awake, OOB > 6 hours per day    Dispo: likely discharge tomorrow pending PT/OT and drain removal    Discussed with Dr. Carlton

## 2024-10-31 NOTE — PLAN OF CARE
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.14]  Thoracic myelopathy [M47.14]    Admission Date: 10/30/2024  Expected Discharge Date: 11/1/2024         Payor: MEDICARE / Plan: MEDICARE PART A & B / Product Type: Government /     Extended Emergency Contact Information  Primary Emergency Contact: Linda Villa   [place]  Mobile Phone: [phone]  Relation: Sister    Discharge Plan A: Home with family, Home Health         [id number] [place] [facility] [address] [address] [facility] [address]  [address] [address]  [facility] [address]  Phone: [phone] Fax: [phone]    [id number] [address]  [address] [address]  Phone: [phone] Fax: [phone]      Initial Assessment (most recent)       Adult Discharge Assessment - 10/31/24 1052          Discharge Assessment    Assessment Type Discharge Planning Assessment     Confirmed/corrected address, phone number and insurance Yes     Confirmed Demographics Correct on Facesheet     Source of Information patient     Does patient/caregiver understand observation status Yes     People in Home alone     Do you expect to return to your current living situation? Yes     Do you have help at home or someone to help you manage your care at home? Yes     Who are your caregiver(s) and their phone number(s)? Linda (Sister)     Prior to hospitilization cognitive status: Alert/Oriented     Current cognitive status: Alert/Oriented     Equipment Currently Used at Home walker, rolling;cane, straight     Readmission within 30 days? No     Patient currently being followed by outpatient case management? No     Do you currently have service(s) that help you manage your care at home? No     Do you take prescription medications? Yes     Do you have prescription coverage? Yes     Do you have any problems affording any of  your prescribed medications? No     Is the patient taking medications as prescribed? yes     Who is going to help you get home at discharge? Sister-Linda     How do you get to doctors appointments? family or friend will provide     Are you on dialysis? No     Do you take coumadin? No     Discharge Plan A Home with family;Home Health        Physical Activity    On average, how many days per week do you engage in moderate to strenuous exercise (like a brisk walk)? 0 days     On average, how many minutes do you engage in exercise at this level? 0 min        Financial Resource Strain    How hard is it for you to pay for the very basics like food, housing, medical care, and heating? Not hard at all        Housing Stability    In the last 12 months, was there a time when you were not able to pay the mortgage or rent on time? No     At any time in the past 12 months, were you homeless or living in a shelter (including now)? No        Transportation Needs    Has the lack of transportation kept you from medical appointments, meetings, work or from getting things needed for daily living? No        Food Insecurity    Within the past 12 months, you worried that your food would run out before you got the money to buy more. Never true     Within the past 12 months, the food you bought just didn't last and you didn't have money to get more. Never true        Stress    Do you feel stress - tense, restless, nervous, or anxious, or unable to sleep at night because your mind is troubled all the time - these days? Not at all        Social Isolation    How often do you feel lonely or isolated from those around you?  Never        Alcohol Use    Q1: How often do you have a drink containing alcohol? 4 or more times a week     Q2: How many drinks containing alcohol do you have on a typical day when you are drinking? 1 or 2     Q3: How often do you have six or more drinks on one occasion? Less than monthly        Utilities    In the past 12  months has the electric, gas, oil, or water company threatened to shut off services in your home? Yes        Health Literacy    How often do you need to have someone help you when you read instructions, pamphlets, or other written material from your doctor or pharmacy? Never                   Spoke with patient at bedside to complete d/c planning assessment. He lives with his father in a single story home without steps to enter. Plans to spend one night in [place] at sister's home following discharge. Independent with ADL's. Home DME includes a walker and Blue cane (AT Bedside). Verified PCP, Pharmacy and health insurance. PT/OT eval pending for post-acute needs. Will have help at home from family. Discharge Plan A and Plan B have been determined by review of patient's clinical status, future medical and therapeutic needs, and coverage/benefits for post-acute care in coordination with multidisciplinary team members.        Raine Spears RNCM  Case Management  [facility]  [phone]

## 2024-10-31 NOTE — PLAN OF CARE
Problem: Adult Inpatient Plan of Care  Goal: Plan of Care Review  Outcome: Progressing  Goal: Patient-Specific Goal (Individualized)  Outcome: Progressing  Goal: Absence of Hospital-Acquired Illness or Injury  Outcome: Progressing  Goal: Optimal Comfort and Wellbeing  Outcome: Progressing  Goal: Readiness for Transition of Care  Outcome: Progressing     Problem: Wound  Goal: Optimal Coping  Outcome: Progressing  Goal: Optimal Functional Ability  Outcome: Progressing  Goal: Absence of Infection Signs and Symptoms  Outcome: Progressing  Goal: Improved Oral Intake  Outcome: Progressing  Goal: Optimal Pain Control and Function  Outcome: Progressing  Goal: Skin Health and Integrity  Outcome: Progressing  Goal: Optimal Wound Healing  Outcome: Progressing     Problem: Skin Injury Risk Increased  Goal: Skin Health and Integrity  Outcome: Progressing     Addressed patients pain, pump, position, need for potty, and possessions in reach. Patient remains free of falls, and verbalizes understanding in fall prevention and safety. Safety measures remain in place. Reinforced fall prevention and safety education. Call light and personal belongings within reach, bed in lowest position with wheels locked, side rails up x2, Instructed to call with any needs or complaints, verbalized understanding. Continue current plan of care.

## 2024-10-31 NOTE — PLAN OF CARE
Problem: Physical Therapy  Goal: Physical Therapy Goal  Description: Goals to be met by: 11/30/2024     Patient will increase functional independence with mobility by performing:    Supine to sit with [place] following spinal precautions  Sit to supine with [place] following spinal precautions  Sit to stand transfer with [place] following spinal precautions  Bed to chair transfer with [place] using Rolling Walker  Gait  x 150 feet with Supervision using Rolling Walker.   Ascend/descend 4 stairs with right Handrails Contact Guard Assistance using No Assistive Device.     Outcome: Progressing

## 2024-10-31 NOTE — PROGRESS NOTES
[facility] - Surgery  Neurosurgery  Progress Note    Subjective:     History of Present Illness: HPI from 7/10/2024:  Orlin Mayo is a 63 y.o. male who presents with a very complicated history he has had multiple spine surgeries including a remote ACDF with a adjacent segment disease slight kyphosis and a spondylolisthesis with moderate stenosis, he has no cord compression or cord signal change, he denies any new upper extremity symptoms.  Functionally stable.  He will get some intermittent paresthesias in the right hand but this is chronic nothing new.     With his regard to his lumbar spine in [place] in April he had a revision surgery and which sounds like an extension of fusion proximally and distally which would now be a fusion from L2-S1 this included an skilled nursing at L5-S1.  He states that he had the surgery for back but right leg pain and paresthesias involving the right foot.  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  He really does not feel like the surgery improved his symptoms.  But no worse  I have no postoperative imaging.     I he did have known proximal mild moderate scoliosis, as well as extensive thoracic degeneration with stenosis what appears to be some myelomalacia.  He states this was known preoperatively he saw 2 surgical opinions had surgery in [place] proceeded with the lumbar surgery.  He has no rapid deterioration but he does have chronic thoracic myelopathy with some imbalance he does feel like this limits his walking and coordination     And lastly 3 weeks ago he underwent a knee replacement with Dr. Acosta which he is actively recovering with no complications, and with physical therapy.     Interval Hx:   Today the patient reports that his main concern is low back pain and burning and paraesthesias in his feet, with the right side worse than left. He states that the back pain gets worse as the day goes on. Until recently, his foot symptoms were more concerning but now his  lower back pain seems to be present more frequently. He says he is having increased balance issues. He states that he has increased urinary frequency over the past few months. He also reports dropping things with his hands and some occasional numbness in his right hand. He has a back brace that he wears occasionally.      Interval historty 10/30/24: presents for surgery    Post-Op Info:  Procedure(s) (LRB):  T8-T11 Laminectomy and posterior instrumented fusion (N/A)   1 Day Post-Op   Interval History: POD 1 T8-T11 laminectomies and fusion. Patient with pain controlled currently. HV drains with 60/5 cc output, likely remove tomorrow. Up with PT/OT. XR completed, hardware in good position.     Medications:  Continuous Infusions:  Scheduled Meds:   acetaminophen  1,000 mg Oral Q8H    calcium carbonate  500 mg Oral BID    ceFAZolin (Ancef) IV (PEDS and ADULTS)  2 g Intravenous Q8H    losartan  25 mg Oral Daily    methocarbamoL  750 mg Oral QID    mupirocin   Nasal BID    pregabalin  75 mg Oral BID    senna-docusate 8.6-50 mg  2 tablet Oral BID     PRN Meds:  Current Facility-Administered Medications:     aluminum-magnesium hydroxide-simethicone, 30 mL, Oral, Q4H PRN    dextrose 10%, 12.5 g, Intravenous, PRN    dextrose 10%, 25 g, Intravenous, PRN    diphenhydrAMINE, 25 mg, Oral, Q6H PRN    HYDROmorphone, 1 mg, Intravenous, Q4H PRN    ondansetron, 8 mg, Oral, Q6H PRN    oxyCODONE, 5 mg, Oral, Q4H PRN    oxyCODONE, 10 mg, Oral, Q4H PRN    prochlorperazine, 5 mg, Intravenous, Q6H PRN    zolpidem, 5 mg, Oral, Nightly PRN     Review of Systems  Objective:     Weight: 72.5 kg (159 lb 13.3 oz)  Body mass index is 24.3 kg/m².  Vital Signs (Most Recent):  Temp: 97.1 °F (36.2 °C) (10/31/24 0730)  Pulse: 80 (10/31/24 0730)  Resp: 18 (10/31/24 1036)  BP: 109/64 (10/31/24 0730)  SpO2: 97 % (10/31/24 0730) Vital Signs (24h Range):  Temp:  [97.1 °F (36.2 °C)-98.5 °F (36.9 °C)] 97.1 °F (36.2 °C)  Pulse:  [72-88] 80  Resp:  [12-23]  18  SpO2:  [96 %-100 %] 97 %  BP: (103-129)/(58-74) 109/64     Date 10/31/24 0700 - 11/01/24 0659   Shift 8083-2928 3550-7607 8121-4152 24 Hour Total   INTAKE   P.O. 300   300   Shift Total(mL/kg) 300(4.1)   300(4.1)   OUTPUT   Drains 10   10   Shift Total(mL/kg) 10(0.1)   10(0.1)   Weight (kg) 72.5 72.5 72.5 72.5                            Closed/Suction Drain 10/30/24 1118 Medial;Right Back Accordion 10 Fr. (Active)   Site Description Unable to view 10/31/24 0730   Dressing Type Gauze 10/31/24 0730   Dressing Status Clean;Dry;Intact 10/31/24 0730   Dressing Intervention Integrity maintained 10/31/24 0730   Drainage Serosanguineous 10/31/24 0730   Status To bulb suction 10/31/24 0730   Output (mL) 5 mL 10/31/24 0833            Closed/Suction Drain 10/30/24 1121 Medial;Left Back Accordion 10 Fr. (Active)   Site Description Unable to view 10/31/24 0730   Dressing Type Gauze 10/31/24 0730   Dressing Status Clean;Dry;Intact 10/31/24 0730   Dressing Intervention Integrity maintained 10/31/24 0730   Drainage Serosanguineous 10/31/24 0730   Status To bulb suction 10/31/24 0730   Output (mL) 5 mL 10/31/24 0833         Neurosurgery Physical Exam  General: well developed, well nourished, no distress.   Head: normocephalic, atraumatic  Neurologic: Alert and oriented. Thought content appropriate.  Cranial nerves: face symmetric  Eyes: EOMI.   Pulmonary: no signs of respiratory distress  Skin: Skin is warm, dry and intact.  Sensory: intact to light touch throughout  Motor Strength. No abnormal movements seen.     Strength  Deltoids Triceps Biceps Wrist Extension Wrist Flexion Hand    Upper: R 5/5 5/5 5/5 5/5 5/5 5/5    L 5/5 5/5 5/5 5/5 5/5 5/5     Iliopsoas Quadriceps Knee  Flexion Tibialis  anterior Gastro- cnemius EHL   Lower: R 5/5 5/5 5/5 5/5 5/5 5/5    L 5/5 5/5 5/5 2/5 2/5 1/5     Surgical dressing C/D/I with HV x 2 to full suction with SS output        Significant Labs:  Recent Labs   Lab 10/31/24  0329     "     K 2.9*   CL 97   CO2 29   BUN 14   CREATININE 0.7   CALCIUM 8.7     Recent Labs   Lab 10/31/24  0329   WBC 10.04   HGB 10.9*   HCT 31.6*        No results for input(s): "LABPT", "INR", "APTT" in the last 48 hours.  Microbiology Results (last 7 days)       ** No results found for the last 168 hours. **          All pertinent labs from the last 24 hours have been reviewed.    Significant Diagnostics:  I have reviewed all pertinent imaging results/findings within the past 24 hours.  Assessment/Plan:     * Thoracic myelopathy  Orlin Mayo has progressive thoracic myelopathy marked by gait spasticity and ataxia, increased urinary frequency, BLE weakness, and BLE dysesthesias. 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 I have recommended a T8-11 laminectomy, facetectomy and posterior instrumented fusion. R/B/A/I/M were reviewed in detail and he wished to proceed. I highlighted the possible risk of progression of his moderate proximal junctional scoliosis above his L2-S1 fusion and the possible future need to extend this construct to the distal hardware. He still wished to proceed        Now s/p T8 to T11 laminectomy and instrumented fusion with Dr. Carlton on 10/30/24    -Neurologically stable   -monitor Q4H  -All pertinent labs and imaging reviewed   -post op XR with hardware in good place  -Multimodal pain regimen   -Maintain drains. Empty and record q4h. Prophylactic abx while drain in place  -Brace on when upright, OOB, or working with therapy. OK to remove when lying in bed  -Voiding spontaneously. Bladder scan q6h PRN urinary retention. In/Out cath for > 300 cc  -OOB. PT/OT  -DVT prophylaxis: SCD's, LVX  -Bowel regimen: senna BID and miralax daily  -Atelectasis prevention: IS hourly while awake, OOB > 6 hours per day    Dispo: likely discharge tomorrow " pending PT/OT and drain removal    Discussed with Dr. Bianka Chery PA-C  Neurosurgery  [facility] - Surgery

## 2024-10-31 NOTE — PLAN OF CARE
Problem: Adult Inpatient Plan of Care  Goal: Plan of Care Review  Outcome: Progressing  Goal: Patient-Specific Goal (Individualized)  Outcome: Progressing  Goal: Absence of Hospital-Acquired Illness or Injury  Outcome: Progressing  Goal: Optimal Comfort and Wellbeing  Outcome: Progressing  Goal: Readiness for Transition of Care  Outcome: Progressing     Pt is alert and oriented. VSS. Pain management has been assessed. Educated pt on pain regime. Pt had 2x emesis. PRN antiemetics and antacid given. Pt states he's vomiting due to acid reflux. Pt was becoming restless and frustrated due to not being able to get comfortable. Educated pt on importance to move carefully to not remove drain tubes. Fall precautions in place, no report of falls. Safety measures are in place, bed locked and in low position. Call light within reach.

## 2024-10-31 NOTE — PT/OT/SLP EVAL
Physical Therapy Co-Evaluation and Treatment    Patient Name:  Orlin Mayo   MRN:  [id number]    OT present for coeval due to pt's multiple medical comorbidities and functional/cognition deficits requiring two skilled therapists to appropriately progress pt's musculoskeletal strength, neuromuscular control, and endurance while taking into consideration medical acuity and pt safety.   Recommendations:     Discharge Recommendations: Low Intensity Therapy   Discharge Equipment Recommendations: none   Barriers to discharge: None    Assessment:     Orlin Mayo is a 64 y.o. male admitted with a medical diagnosis of Thoracic myelopathy.  He presents with the following impairments/functional limitations: weakness, impaired self care skills, impaired functional mobility, decreased lower extremity function, pain, decreased ROM, impaired skin, orthopedic precautions.  Pt was agreeable to PT/OT co-eval, cooperative, and pleasant. Pt educated on spinal precautions including bending, lifting, and twisting. During initial visit today, pt was found to have no TLSO brace so brace ordered and obtained during 2nd visit. Pt information was obtained during first visit. Pt found standing in bedroom during 2nd visit without TLSO brace. Pt TLSO brace fitted during visit. Pt is SBA with bed mobility and sit<>stand. Pt ambulated 270ft with RW and CGA down hallways. Pt ambulated with slight B foot drop, good foot clearance, and stride length. Pt would continue to benefit from skilled PT to improve his overall standing and walking tolerance to return to PLOF.    Rehab Prognosis: Good; patient would benefit from acute skilled PT services to address these deficits and reach maximum level of function.    Recent Surgery: Procedure(s) (LRB):  T8-T11 Laminectomy and posterior instrumented fusion (N/A) 1 Day Post-Op    Plan:     During this hospitalization, patient to be seen 3 x/week to address the identified rehab impairments via gait  training, therapeutic activities, therapeutic exercises, neuromuscular re-education and progress toward the following goals:    Plan of Care Expires:  11/30/24    Subjective     Chief Complaint: Back and foot neuropathy Pain  Patient/Family Comments/goals: Return to PLOF with less pain  Pain/Comfort:  Pain Rating 1: 6/10  Location - Side 1: Bilateral  Location - Orientation 1: generalized  Location 1: back  Pain Addressed 1: Pre-medicate for activity, Reposition, Distraction, Cessation of Activity  Pain Rating Post-Intervention 1: 6/10    Patients cultural, spiritual, Rastafari conflicts given the current situation: no    Living Environment:  Pt will be living with father in 2[place] with bedroom and bathroom on 1st floor with 0STE. Pt has tub/shower combo.  Prior to admission, patients level of function was I with mobility and ADLs.  Equipment used at home: none .  DME owned (not currently used): rolling walker.  Upon discharge, patient will have assistance from Father.    Objective:     Communicated with RN prior to session.  Patient found HOB elevated with hemovac  upon PT entry to room.    General Precautions: Standard, fall  Orthopedic Precautions:spinal precautions   Braces: TLSO  Respiratory Status: Room air    Exams:  Cognitive Exam:  Patient is oriented to Person, Place, Time, and Situation  RLE ROM: WFL  RLE Strength: WFL  LLE ROM: WFL  LLE Strength: WFL    Functional Mobility:  Bed Mobility:     Rolling Left:  stand by assistance. Verbal cues to bend knees and roll torso and legs simultaneously.  Scooting: stand by assistance  Supine to Sit: stand by assistance  Transfers:     Sit to Stand:  stand by assistance with rolling walker  Gait: ~280ft with RW and CGA over flat hard surface of hallways. Pt ambulated with slight B foot drop, good foot clearance, and stride length.      AM-PAC 6 CLICK MOBILITY  Total Score:24       Treatment & Education:  TLSO Brace fitting    Pt edu on:  - Importance of mobility for  maximum recovery  - Proper transfers and ambulation using RW  - PT role and POC expectations  - Spinal precautions (Bending, lifting, twisting)      Patient left up in chair with all lines intact, call button in reach, and RN notified.    GOALS:   Multidisciplinary Problems       Physical Therapy Goals          Problem: Physical Therapy    Goal Priority Disciplines Outcome Interventions   Physical Therapy Goal     PT, PT/OT Progressing    Description: Goals to be met by: 11/30/2024     Patient will increase functional independence with mobility by performing:    Supine to sit with [place] following spinal precautions  Sit to supine with [place] following spinal precautions  Sit to stand transfer with [place] following spinal precautions  Bed to chair transfer with [place] using Rolling Walker  Gait  x 150 feet with Supervision using Rolling Walker.   Ascend/descend 4 stairs with right Handrails Contact Guard Assistance using No Assistive Device.                          History:     Past Medical History:   Diagnosis Date    Acute asthma exacerbation 02/05/2021    Anemia     Anxiety     Asthma     Cervical spondylosis 10/14/2016    Chronic back pain     COPD (chronic obstructive pulmonary disease)     Depression     GERD (gastroesophageal reflux disease)     History of prosthetic unicompartmental arthroplasty of right knee 02/24/2020    Hyperlipidemia     Hypertension 08/15/2016    Major depressive disorder, recurrent episode, mild 02/07/2008    Mild intermittent asthma, uncomplicated 07/19/2018       Past Surgical History:   Procedure Laterality Date    ARTHROSCOPIC REPAIR OF ROTATOR CUFF OF SHOULDER Right 08/01/2019    COLONOSCOPY      HERNIA REPAIR      JOINT REPLACEMENT      left shoulder surgery      LUMBAR FUSION      x2    REPAIR OF TRICEPS TENDON Right     3 surgeries    REPAIR OF TRICEPS TENDON Left     x1    right knee tkr Right     ROTATOR CUFF REPAIR Bilateral     2 on right , 1 to  left    THORACIC LAMINECTOMY WITH FUSION N/A 10/30/2024    Procedure: T8-T11 Laminectomy and posterior instrumented fusion;  Surgeon: Dawit Carlton MD;  Location: [facility] OR [address] [place] [address];  Service: Neurosurgery;  Laterality: N/A;  T8-T11 laminectomy and posterior instrumented fusion  anesthesia: general  brace: TLSO  radiology: C-arm  neuro mon: EMG/SEP/MEP  position: prone  bed: [place] [address] post  headrest: [facility] with hanging wts  misc: stacey oro    UNICOMPARTMENTAL ARTHROPLASTY OF KNEE Right        Time Tracking:     PT Received On: 10/31/24  PT Start Time: 0938 (2nd Visit: 1115)    PT Stop Time: 0950 (2nd Visit: 1126)  PT Total Time (min): 12 + 12 = 24 min     Billable Minutes: Evaluation 10 and Gait Training 14      10/31/2024

## 2024-10-31 NOTE — PT/OT/SLP EVAL
Occupational Therapy   Co-Evaluation  Co-treatment performed due to patient's multiple deficits requiring two skilled therapists to appropriately and safely assess patient's strength and endurance while facilitating functional tasks in addition to accommodating for patient's activity tolerance.      Name: Orlin Mayo  MRN: [id number]  Admitting Diagnosis: Thoracic myelopathy  Recent Surgery: Procedure(s) (LRB):  T8-T11 Laminectomy and posterior instrumented fusion (N/A) 1 Day Post-Op    Recommendations:     Discharge Recommendations: No Therapy Indicated  Discharge Equipment Recommendations:  none  Barriers to discharge:  None    Assessment:     Orlin Mayo is a 64 y.o. male with a medical diagnosis of Thoracic myelopathy.  He presents with the following performance deficits affecting function: weakness, impaired endurance, impaired self care skills, impaired functional mobility, gait instability, impaired balance, decreased coordination, decreased safety awareness, pain, orthopedic precautions.  Pt agreeable to therapy and tolerated well. OT/PT returned on 2nd attempt 2/2 no TLSO brace found upon entry to room. Pt engaged in self care tasks seated EOB and functional mobility in room/hallway with CGA-SBA this day without incident.  Pt would continue to benefit from skilled OT services to maximize functional independence with ADLs and functional mobility, reduce caregiver burden, and facilitate safe discharge in the least restrictive environment.      Rehab Prognosis: Good; patient would benefit from acute skilled OT services to address these deficits and reach maximum level of function.       Plan:     Patient to be seen 4 x/week to address the above listed problems via self-care/home management, therapeutic activities, therapeutic exercises, neuromuscular re-education  Plan of Care Expires: 11/30/24  Plan of Care Reviewed with: patient    Subjective     Chief Complaint: pain  Patient/Family Comments/goals:  to return to PLOF    Occupational Profile:  Living Environment: Pt lives with his father in a 2SH with no steps. Bed/bath on the 1st floor with t/s combo  Previous level of function: Independent  Roles and Routines: (+) drive  Equipment Used at Home: cane, straight, walker, rolling  Assistance upon Discharge: pt's father    Pain/Comfort:  Pain Rating 1: 6/10  Location - Orientation 1: generalized  Location 1: back  Pain Addressed 1: Pre-medicate for activity, Reposition, Distraction  Pain Rating Post-Intervention 1: 6/10    Patients cultural, spiritual, Restoration conflicts given the current situation: no    Objective:   Additional staff present:  Croie PT and Toñito SPT    Communicated with: RN prior to session.  Patient found ambulatory in room/cornejo with hemovac upon OT entry to room.    General Precautions: Standard, fall  Orthopedic Precautions: spinal precautions  Braces: TLSO  Respiratory Status: Room air    Occupational Performance:    Bed Mobility:    Not assessed- pt met OOB and left UIC    Functional Mobility/Transfers:  Patient completed Sit <> Stand Transfer with stand by assistance  with  rolling walker   Patient completed Toilet Transfer Step Transfer technique with contact guard assistance with  rolling walker  Functional Mobility: Pt engaged in functional mobility to simulate household/community distances ~280 ft with CGA and RW in order to maximize functional endurance and standing balance required for engagement in occupations of choice   No LOB or SOB noted  Cues for RW management  1 standing rest break required    Activities of Daily Living:  Upper Body Dressing: stand by assistance to don hospital gown over back  Lower Body Dressing: contact guard assistance for functional balance while pt donned shoes EOB with hip/knee flexion    Cognitive/Visual Perceptual:  Cognitive/Psychosocial Skills:     -       Oriented to: Person, Place, Time, and Situation   -       Follows Commands/attention:Follows  multistep  commands  -       Safety awareness/insight to disability: impaired   -       Mood/Affect/Coping skills/emotional control: Cooperative    Physical Exam:  Sensation:    -       Intact  Upper Extremity Range of Motion:     -       Right Upper Extremity: WFL  -       Left Upper Extremity: WFL  Upper Extremity Strength:    -       Right Upper Extremity: WFL  -       Left Upper Extremity: WFL    AMPAC 6 Click ADL:  AMPAC Total Score: 19    Treatment & Education:  -Education provided regarding fit and wear schedule of TLSO, adjustments provided for comfort.  -Education provided regarding spinal precautions for use during functional tasks/mobility/transfers   -Provided education regarding role of OT, POC, & discharge recommendations with pt verbalizing understanding.  Pt had no further questions & when asked whether there were any concerns pt reported none.     Patient left up in chair with all lines intact, call button in reach, and RN notified    GOALS:   Multidisciplinary Problems       Occupational Therapy Goals          Problem: Occupational Therapy    Goal Priority Disciplines Outcome Interventions   Occupational Therapy Goal     OT, PT/OT Progressing    Description: Goals to be met by: 11/30/24     Patient will increase functional independence with ADLs by performing:    UE Dressing with Modified [place].  LE Dressing with Modified [place].  Grooming while standing at sink with Modified [place].  Toileting from toilet/bedside commode with Modified [place] for hygiene and clothing management.   Toilet transfer to toilet/bedside commode with Modified [place].                         History:     Past Medical History:   Diagnosis Date    Acute asthma exacerbation 02/05/2021    Anemia     Anxiety     Asthma     Cervical spondylosis 10/14/2016    Chronic back pain     COPD (chronic obstructive pulmonary disease)     Depression     GERD (gastroesophageal reflux disease)     History of  prosthetic unicompartmental arthroplasty of right knee 02/24/2020    Hyperlipidemia     Hypertension 08/15/2016    Major depressive disorder, recurrent episode, mild 02/07/2008    Mild intermittent asthma, uncomplicated 07/19/2018         Past Surgical History:   Procedure Laterality Date    ARTHROSCOPIC REPAIR OF ROTATOR CUFF OF SHOULDER Right 08/01/2019    COLONOSCOPY      HERNIA REPAIR      JOINT REPLACEMENT      left shoulder surgery      LUMBAR FUSION      x2    REPAIR OF TRICEPS TENDON Right     3 surgeries    REPAIR OF TRICEPS TENDON Left     x1    right knee tkr Right     ROTATOR CUFF REPAIR Bilateral     2 on right , 1 to left    THORACIC LAMINECTOMY WITH FUSION N/A 10/30/2024    Procedure: T8-T11 Laminectomy and posterior instrumented fusion;  Surgeon: Dawit Carlton MD;  Location: [facility] OR [address] [place] [address];  Service: Neurosurgery;  Laterality: N/A;  T8-T11 laminectomy and posterior instrumented fusion  anesthesia: general  brace: TLSO  radiology: C-arm  neuro mon: EMG/SEP/MEP  position: prone  bed: [place] [id number] post  headrest: [facility] with hanging wts  misc: stacey oro    UNICOMPARTMENTAL ARTHROPLASTY OF KNEE Right        Time Tracking:     OT Date of Treatment: 10/31/24  OT Start Time: 1st visit: 0938 (2nd visit: 1115)  OT Stop Time: 1st visit: 0950 (2nd visit: 1127)  OT Total Time (min): 24 min 12 + 12 = 24 minutes total     Billable Minutes:Evaluation 14  Self Care/Home Management 10    10/31/2024

## 2024-11-01 VITALS
RESPIRATION RATE: 16 BRPM | HEIGHT: 68 IN | OXYGEN SATURATION: 93 % | SYSTOLIC BLOOD PRESSURE: 113 MMHG | DIASTOLIC BLOOD PRESSURE: 64 MMHG | TEMPERATURE: 98 F | WEIGHT: 159.81 LBS | HEART RATE: 91 BPM | BODY MASS INDEX: 24.22 KG/M2

## 2024-11-01 LAB
ANION GAP SERPL CALC-SCNC: 10 MMOL/L (ref 8–16)
BASOPHILS # BLD AUTO: 0.01 K/UL (ref 0–0.2)
BASOPHILS NFR BLD: 0.1 % (ref 0–1.9)
BUN SERPL-MCNC: 12 MG/DL (ref 8–23)
CALCIUM SERPL-MCNC: 8.7 MG/DL (ref 8.7–10.5)
CHLORIDE SERPL-SCNC: 96 MMOL/L (ref 95–110)
CO2 SERPL-SCNC: 28 MMOL/L (ref 23–29)
CREAT SERPL-MCNC: 0.7 MG/DL (ref 0.5–1.4)
DIFFERENTIAL METHOD BLD: ABNORMAL
EOSINOPHIL # BLD AUTO: 0 K/UL (ref 0–0.5)
EOSINOPHIL NFR BLD: 0 % (ref 0–8)
ERYTHROCYTE [DISTWIDTH] IN BLOOD BY AUTOMATED COUNT: 12.1 % (ref 11.5–14.5)
EST. GFR  (NO RACE VARIABLE): >60 ML/MIN/1.73 M^2
GLUCOSE SERPL-MCNC: 98 MG/DL (ref 70–110)
HCT VFR BLD AUTO: 30.7 % (ref 40–54)
HGB BLD-MCNC: 10.2 G/DL (ref 14–18)
IMM GRANULOCYTES # BLD AUTO: 0.05 K/UL (ref 0–0.04)
IMM GRANULOCYTES NFR BLD AUTO: 0.5 % (ref 0–0.5)
LYMPHOCYTES # BLD AUTO: 1.7 K/UL (ref 1–4.8)
LYMPHOCYTES NFR BLD: 17.8 % (ref 18–48)
MCH RBC QN AUTO: 30.7 PG (ref 27–31)
MCHC RBC AUTO-ENTMCNC: 33.2 G/DL (ref 32–36)
MCV RBC AUTO: 93 FL (ref 82–98)
MONOCYTES # BLD AUTO: 1.2 K/UL (ref 0.3–1)
MONOCYTES NFR BLD: 11.9 % (ref 4–15)
NEUTROPHILS # BLD AUTO: 6.8 K/UL (ref 1.8–7.7)
NEUTROPHILS NFR BLD: 69.7 % (ref 38–73)
NRBC BLD-RTO: 0 /100 WBC
PLATELET # BLD AUTO: 202 K/UL (ref 150–450)
PMV BLD AUTO: 10.3 FL (ref 9.2–12.9)
POTASSIUM SERPL-SCNC: 3.9 MMOL/L (ref 3.5–5.1)
RBC # BLD AUTO: 3.32 M/UL (ref 4.6–6.2)
SODIUM SERPL-SCNC: 134 MMOL/L (ref 136–145)
WBC # BLD AUTO: 9.69 K/UL (ref 3.9–12.7)

## 2024-11-01 PROCEDURE — 36415 COLL VENOUS BLD VENIPUNCTURE: CPT | Performed by: STUDENT IN AN ORGANIZED HEALTH CARE EDUCATION/TRAINING PROGRAM

## 2024-11-01 PROCEDURE — 80048 BASIC METABOLIC PNL TOTAL CA: CPT | Performed by: STUDENT IN AN ORGANIZED HEALTH CARE EDUCATION/TRAINING PROGRAM

## 2024-11-01 PROCEDURE — 97530 THERAPEUTIC ACTIVITIES: CPT | Mod: CO

## 2024-11-01 PROCEDURE — 99024 POSTOP FOLLOW-UP VISIT: CPT | Mod: POP,,, | Performed by: PHYSICIAN ASSISTANT

## 2024-11-01 PROCEDURE — 85025 COMPLETE CBC W/AUTO DIFF WBC: CPT | Performed by: STUDENT IN AN ORGANIZED HEALTH CARE EDUCATION/TRAINING PROGRAM

## 2024-11-01 PROCEDURE — 63600175 PHARM REV CODE 636 W HCPCS: Performed by: STUDENT IN AN ORGANIZED HEALTH CARE EDUCATION/TRAINING PROGRAM

## 2024-11-01 PROCEDURE — 25000003 PHARM REV CODE 250: Performed by: STUDENT IN AN ORGANIZED HEALTH CARE EDUCATION/TRAINING PROGRAM

## 2024-11-01 RX ORDER — ACETAMINOPHEN 500 MG
1000 TABLET ORAL EVERY 8 HOURS
Start: 2024-11-01

## 2024-11-01 RX ORDER — OXYCODONE HYDROCHLORIDE 10 MG/1
10 TABLET ORAL EVERY 4 HOURS PRN
Qty: 40 TABLET | Refills: 0 | Status: SHIPPED | OUTPATIENT
Start: 2024-11-01 | End: 2024-11-07 | Stop reason: SDUPTHER

## 2024-11-01 RX ORDER — METHOCARBAMOL 750 MG/1
750 TABLET, FILM COATED ORAL 4 TIMES DAILY
Qty: 40 TABLET | Refills: 0 | Status: SHIPPED | OUTPATIENT
Start: 2024-11-01 | End: 2024-11-11

## 2024-11-01 RX ORDER — PREGABALIN 75 MG/1
75 CAPSULE ORAL 2 TIMES DAILY
Qty: 60 CAPSULE | Refills: 1 | Status: SHIPPED | OUTPATIENT
Start: 2024-11-01 | End: 2025-05-02

## 2024-11-01 RX ADMIN — METHOCARBAMOL 750 MG: 750 TABLET ORAL at 07:11

## 2024-11-01 RX ADMIN — OXYCODONE HYDROCHLORIDE 10 MG: 10 TABLET ORAL at 07:11

## 2024-11-01 RX ADMIN — CALCIUM CARBONATE (ANTACID) CHEW TAB 500 MG 500 MG: 500 CHEW TAB at 07:11

## 2024-11-01 RX ADMIN — ACETAMINOPHEN 1000 MG: 500 TABLET ORAL at 05:11

## 2024-11-01 RX ADMIN — CEFAZOLIN 2 G: 2 INJECTION, POWDER, FOR SOLUTION INTRAMUSCULAR; INTRAVENOUS at 07:11

## 2024-11-01 RX ADMIN — OXYCODONE HYDROCHLORIDE 10 MG: 10 TABLET ORAL at 01:11

## 2024-11-01 RX ADMIN — OXYCODONE HYDROCHLORIDE 10 MG: 10 TABLET ORAL at 02:11

## 2024-11-01 RX ADMIN — SENNOSIDES AND DOCUSATE SODIUM 2 TABLET: 50; 8.6 TABLET ORAL at 07:11

## 2024-11-01 NOTE — PLAN OF CARE
[facility]      HOME HEALTH ORDERS  FACE TO FACE ENCOUNTER    Patient Name: Orlin Mayo  YOB: 1960    PCP: Fátima, Primary Doctor   PCP Address: None  PCP Phone Number: None  PCP Fax: None    Encounter Date: 8/22/24    Admit to Home Health    Diagnoses:  Active Hospital Problems    Diagnosis  POA    *Thoracic myelopathy [M47.14]  Yes      Resolved Hospital Problems   No resolved problems to display.       Follow Up Appointments:  Future Appointments   Date Time Provider Department Center   11/13/2024 11:00 AM Susan Romeo RN [place] [id number] [facility]   12/12/2024 10:45 AM [facility]   12/12/2024 11:30 AM Dawit Carlton MD [address]       Allergies:  Review of patient's allergies indicates:   Allergen Reactions    Animal dander     Penicillins     Pollen extracts        Medications: Review discharge medications with patient and family and provide education.    Current Facility-Administered Medications   Medication Dose Route Frequency Provider Last Rate Last Admin    acetaminophen tablet 1,000 mg  1,000 mg Oral Q8H Julito Mathur MD   1,000 mg at 11/01/24 0529    aluminum-magnesium hydroxide-simethicone 200-200-20 mg/5 mL suspension 30 mL  30 mL Oral Q4H PRN Julito Mathur MD   30 mL at 10/30/24 9459    calcium carbonate 200 mg calcium (500 mg) chewable tablet 500 mg  500 mg Oral BID Julito Mathur MD   500 mg at 11/01/24 0747    ceFAZolin 2 g  2 g Intravenous Q8H Julito Mathur MD   2 g at 11/01/24 0749    dextrose 10% bolus 125 mL 125 mL  12.5 g Intravenous PRN Shaggy Nichole Jr., MD        dextrose 10% bolus 250 mL 250 mL  25 g Intravenous PRN Shaggy Nichole Jr., MD        diphenhydrAMINE capsule 25 mg  25 mg Oral Q6H PRN Julito Mathur MD        HYDROmorphone injection 1 mg  1 mg Intravenous Q4H PRN Julito Mathur MD   1 mg at 10/30/24 1615    losartan tablet 25 mg  25 mg Oral  Daily Julito Mathur MD   25 mg at 10/31/24 0834    methocarbamoL tablet 750 mg  750 mg Oral QID Julito Mathur MD   750 mg at 11/01/24 0747    mupirocin 2 % ointment   Nasal BID Julito Mathur MD   Given at 10/31/24 2151    ondansetron disintegrating tablet 8 mg  8 mg Oral Q6H PRN Julito Mathur MD   8 mg at 10/30/24 2027    oxyCODONE immediate release tablet 5 mg  5 mg Oral Q4H PRN Julito Mathur MD   5 mg at 10/31/24 1826    oxyCODONE immediate release tablet Tab 10 mg  10 mg Oral Q4H PRN Julito Mathur MD   10 mg at 11/01/24 0747    pregabalin capsule 75 mg  75 mg Oral BID Julito Mathur MD   75 mg at 10/31/24 0834    prochlorperazine injection Soln 5 mg  5 mg Intravenous Q6H PRN Julito Mathur MD        senna-docusate 8.6-50 mg per tablet 2 tablet  2 tablet Oral BID Julito Mathur MD   2 tablet at 11/01/24 0747    zolpidem tablet 5 mg  5 mg Oral Nightly PRN Julito Mathur MD   5 mg at 10/31/24 2201        Medication List        START taking these medications      acetaminophen 500 MG tablet  Commonly known as: TYLENOL  Take 2 tablets (1,000 mg total) by mouth every 8 (eight) hours.     oxyCODONE 10 mg Tab immediate release tablet  Commonly known as: ROXICODONE  Take 1 tablet (10 mg total) by mouth every 4 (four) hours as needed for Pain.            CHANGE how you take these medications      methocarbamoL 750 MG Tab  Commonly known as: ROBAXIN  Take 1 tablet (750 mg total) by mouth 4 (four) times daily. for 10 days  What changed:   medication strength  how much to take  when to take this  reasons to take this     pregabalin 75 MG capsule  Commonly known as: LYRICA  Take 1 capsule (75 mg total) by mouth 2 (two) times daily.  What changed:   medication strength  how much to take  when to take this            CONTINUE taking these medications      cetirizine 10 MG tablet  Commonly known as: ZYRTEC  Take 10  mg by mouth once daily.     diphenhydrAMINE 25 mg capsule  Commonly known as: BENADRYL  Take 25 mg by mouth every 6 (six) hours as needed for Itching.     LIDOcaine-prilocaine cream  Commonly known as: EMLA  Apply topically as needed (up to 6 times a day).     losartan 50 MG tablet  Commonly known as: COZAAR  Take 0.5 tablets (25 mg total) by mouth once daily.     omeprazole 40 MG capsule  Commonly known as: PRILOSEC  Take 40 mg by mouth 2 (two) times daily before meals.     PROAIR HFA 90 mcg/actuation inhaler  Generic drug: albuterol  Inhale 2 puffs into the lungs every 6 (six) hours as needed for Wheezing. Rescue            STOP taking these medications      ibuprofen 600 MG tablet  Commonly known as: ADVIL,MOTRIN     UNABLE TO FIND     UNABLE TO FIND     UNABLE TO FIND     UNABLE TO FIND            ASK your doctor about these medications      chlorthalidone 25 MG Tab  Commonly known as: HYGROTEN  Take 1 tablet (25 mg total) by mouth once daily.     testosterone 1 % (50 mg/5 gram) Glpk  Commonly known as: ANDROGEL  Apply topically once daily.     zolpidem 10 mg Tab  Commonly known as: AMBIEN  Take 5 mg by mouth nightly as needed.                I have seen and examined this patient within the last 30 days. My clinical findings that support the need for the home health skilled services and home bound status are the following:no   Weakness/numbness causing balance and gait disturbance due to Surgery making it taxing to leave home.     Diet:   regular diet      Referrals/ Consults  Physical Therapy to evaluate and treat. Evaluate for home safety and equipment needs; Establish/upgrade home exercise program. Perform / instruct on therapeutic exercises, gait training, transfer training, and Range of Motion.  Occupational Therapy to evaluate and treat. Evaluate home environment for safety and equipment needs. Perform/Instruct on transfers, ADL training, ROM, and therapeutic exercises.    Activities:   activity as  tolerated    Nursing:   Agency to admit patient within 24 hours of hospital discharge unless specified on physician order or at patient request    SN to complete comprehensive assessment including routine vital signs. Instruct on disease process and s/s of complications to report to MD. Review/verify medication list sent home with the patient at time of discharge  and instruct patient/caregiver as needed. Frequency may be adjusted depending on start of care date.     Skilled nurse to perform up to 3 visits PRN for symptoms related to diagnosis    Notify MD if SBP > 160 or < 90; DBP > 90 or < 50; HR > 120 or < 50; Temp > 101; O2 < 88%; Other:       Ok to schedule additional visits based on staff availability and patient request on consecutive days within the home health episode.    When multiple disciplines ordered:    Start of Care occurs on Sunday - Wednesday schedule remaining discipline evaluations as ordered on separate consecutive days following the start of care.    Thursday SOC -schedule subsequent evaluations Friday and Monday the following week.     Friday - Saturday SOC - schedule subsequent discipline evaluations on consecutive days starting Monday of the following week.    For all post-discharge communication and subsequent orders please contact patient's primary care physician. If unable to reach primary care physician or do not receive response within 30 minutes, please contact [phone] for clinical staff order clarification      Home Health Aide:  Physical Therapy Three times weekly and Occupational Therapy Three times weekly    I certify that this patient is confined to his home and needs physical therapy and occupational therapy.

## 2024-11-01 NOTE — PLAN OF CARE
Problem: Adult Inpatient Plan of Care  Goal: Plan of Care Review  Outcome: Progressing  Goal: Patient-Specific Goal (Individualized)  Outcome: Progressing  Goal: Absence of Hospital-Acquired Illness or Injury  Outcome: Progressing  Goal: Optimal Comfort and Wellbeing  Outcome: Progressing  Goal: Readiness for Transition of Care  Outcome: Progressing     Pt is progressing towards plan of care goals. Pt reported mild discomfort and soreness to incision site. PRN medication given. Incisional dressings reinforced due to poor integrity. Pt took home medication without consulting with staff prior to intake. On call provider notified. Educated pt on importance of not taking at home medication without consulting staff first. Pt has not been using brace when up out of bed, educated pt on importance of wearing brace. Explained plan of care, pt verbalized understanding. No injury during shift, Side rails up x 2, walker at bedside, call light within reach.

## 2024-11-01 NOTE — PLAN OF CARE
11/01/24 1113   Post-Acute Status   Post-Acute Authorization Home Health   Home Health Status Referrals Sent   Discharge Plan   Discharge Plan A Home Health     SW faxed referral to [facility] via [facility] for review. SW spoke to the pt at bedside to discus post-acute needs, agreeable to . SW will follow up as needed.    [facility] Assigned.      Deb Pastor LMSW  Case Management   [facility]   Ext. [phone]

## 2024-11-01 NOTE — DISCHARGE INSTRUCTIONS
Neurosurgery Patient Information. Please follow ONLY the instructions that are checked below.    Activity Restrictions:  [x]  Return to work will be determined on an individual basis.  [x]  No lifting greater than 5-10 pounds.  [x]  Avoid bending and twisting the area of your surgery more than 45 degrees from neutral position in any direction.  [x]  No driving or operating machinery:  [x]  until cleared by your surgeon.  [x]  while taking narcotic pain medications or muscle relaxants.  [x]  Wear your brace at all times except when lying in bed, showering, using the restroom, or performing hygiene tasks.   [x]  Slowly increase your ambulation [walking] over the next 2 weeks as tolerated. The goal is to be walking 1-2 miles by the time of your 2 week post op appointment.   [x]  Walk on paved surfaces only. It is okay to walk up and down stairs while holding onto a side rail.  [x]  No sexual activity for 6 weeks.    Discharge Medication/Follow-up:  [x]  Please refer to discharge medication reconciliation form.  [x]  Do not take ANY Aspirin or Aspirin containing products for 2 weeks after surgery.   [x]  Do not take ANY herbal supplements for 2 weeks after surgery.    [x]  Do not take ANY non-steroidal anti-inflammatory drugs (NSAIDS), including the following: ibuprofen, naprosyn, Aleve, Advil, Indocin, Mobic, or Celebrex for 6 weeks after surgery.   [x]  Prescriptions for appropriate medication will be given upon discharge.  [x]  Take docusate (Colace 100 mg): take one capsule a day as needed for constipation. You can get this over the counter.  [x]  Follow-up appointment:  [x]  10-14 days post-op for wound check by physician assistant/nurse  [x]  4-6 weeks with MD:  [x]  with x-rays  [x]  An appointment will be mailed to you.    Wound Care:  [x]  No bandage required. Keep your incision open to the air.  [x]  You may shower on the 2nd day after your surgery. Keep the incision clean and dry at all times. Please cover  the incision while showering and REMOVE once you have completed taking your shower. Do not allow the force of water to hit the incision. If the incision gets damp, pat it dry. Do not rub or scrub the incision.  [x]  You cannot take a bath until 8 weeks after surgery.  [x]  The incision does not need to be cleaned with any water, soap, alcohol, peroxide, or other substance.  [x]  Apply bacitracin to incision twice a day for 2 weeks.    Call your doctor or go to the Emergency Room for any signs of infection, including: increased redness, drainage, pain, or fever (temperature >=101.5 for 24 hours). Call your doctor or go to the Emergency Room if there are any localized neurological changes; problems with speech, vision, numbness, tingling, weakness, or severe headache; or for other concerns.    Special Instructions:  [x]  No use of tobacco products.  [x]  Diet: Please eat a regular diet as tolerated.      Physicians need 3 days' notice for pain medicine to be refilled. Pain medicine will only be refilled between 8 AM and 5 PM, Monday through Friday, due to Food and Drug Administration regulation of documentation.        [facility] Neurosurgery Office: [phone]              [facility]: [phone]   [place] Neurosurgery Office: [phone]

## 2024-11-01 NOTE — DISCHARGE SUMMARY
[facility]  Neurosurgery  Discharge Summary      Patient Name: Orlin Mayo  MRN: [id number]  Admission Date: 10/30/2024  Hospital Length of Stay: 2 days  Discharge Date and Time: 11/1/24  Attending Physician: Dawit Carlton MD   Discharging Provider: Martha Sam PA-C  Primary Care Provider: Fátima Primary Doctor    HPI:   HPI from 7/10/2024:  Orlin Mayo is a 63 y.o. male who presents with a very complicated history he has had multiple spine surgeries including a remote ACDF with a adjacent segment disease slight kyphosis and a spondylolisthesis with moderate stenosis, he has no cord compression or cord signal change, he denies any new upper extremity symptoms.  Functionally stable.  He will get some intermittent paresthesias in the right hand but this is chronic nothing new.     With his regard to his lumbar spine in [place] in April he had a revision surgery and which sounds like an extension of fusion proximally and distally which would now be a fusion from L2-S1 this included an JJ at L5-S1.  He states that he had the surgery for back but right leg pain and paresthesias involving the right foot.  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  He really does not feel like the surgery improved his symptoms.  But no worse  I have no postoperative imaging.     I he did have known proximal mild moderate scoliosis, as well as extensive thoracic degeneration with stenosis what appears to be some myelomalacia.  He states this was known preoperatively he saw 2 surgical opinions had surgery in [place] proceeded with the lumbar surgery.  He has no rapid deterioration but he does have chronic thoracic myelopathy with some imbalance he does feel like this limits his walking and coordination     And lastly 3 weeks ago he underwent a knee replacement with Dr. Acosta which he is actively recovering with no complications, and with physical therapy.     Interval Hx:   Today the patient reports  that his main concern is low back pain and burning and paraesthesias in his feet, with the right side worse than left. He states that the back pain gets worse as the day goes on. Until recently, his foot symptoms were more concerning but now his lower back pain seems to be present more frequently. He says he is having increased balance issues. He states that he has increased urinary frequency over the past few months. He also reports dropping things with his hands and some occasional numbness in his right hand. He has a back brace that he wears occasionally.      Interval historty 10/30/24: presents for surgery    Procedure(s) (LRB):  T8-T11 Laminectomy and posterior instrumented fusion (N/A)     Hospital Course: 10/31/2024: POD 1 T8-T11 laminectomies and fusion. Patient with pain controlled currently. HV drains with 60/5 cc output, likely remove tomorrow. Up with PT/OT. XR completed, hardware in good position.   11/1: NAEON. Pain well controlled. Drains removed without issue. Voiding spontaneously. Stable for dc home with home health. Dc instructions discussed with patient he voiced understanding. All of his questions were answered.    Exam day of discharge:  General: well developed, well nourished, no distress.   Head: normocephalic, atraumatic  Neurologic: Alert and oriented. Thought content appropriate.  Cranial nerves: face symmetric  Eyes: EOMI.   Pulmonary: no signs of respiratory distress  Skin: Skin is warm, dry and intact.  Sensory: intact to light touch throughout  Motor Strength. No abnormal movements seen.      Strength   Deltoids Triceps Biceps Wrist Extension Wrist Flexion Hand    Upper: R 5/5 5/5 5/5 5/5 5/5 5/5     L 5/5 5/5 5/5 5/5 5/5 5/5       Iliopsoas Quadriceps Knee  Flexion Tibialis  anterior Gastro- cnemius EHL   Lower: R 5/5 5/5 5/5 5/5 5/5 5/5     L 5/5 5/5 5/5 2/5 2/5 1/5      Surgical incision: staples intact with skin edges well aligned. No surrounding erythema or edema           Goals of Care Treatment Preferences:         Consults:     Significant Diagnostic Studies: N/A    Pending Diagnostic Studies:       None          Final Active Diagnoses:    Diagnosis Date Noted POA    PRINCIPAL PROBLEM:  Thoracic myelopathy [M47.14] 10/30/2024 Yes      Problems Resolved During this Admission:      Discharged Condition: good     Disposition: Home or Self Care    Follow Up:   Future Appointments   Date Time Provider Department Center   11/13/2024 11:00 AM Susan Romeo RN [facility] [address]   12/12/2024 10:45 AM [facility] OIC EOS [facility] EOS [facility] Imaging [facility]tr   12/12/2024 11:30 AM Dawit Carlton MD [facility] [address]         Patient Instructions:      Notify your health care provider if you experience any of the following:  temperature >100.4     Notify your health care provider if you experience any of the following:  persistent nausea and vomiting or diarrhea     Notify your health care provider if you experience any of the following:  severe uncontrolled pain     Notify your health care provider if you experience any of the following:  redness, tenderness, or signs of infection (pain, swelling, redness, odor or green/yellow discharge around incision site)     Notify your health care provider if you experience any of the following:  difficulty breathing or increased cough     Notify your health care provider if you experience any of the following:  severe persistent headache     Notify your health care provider if you experience any of the following:  worsening rash     Notify your health care provider if you experience any of the following:  persistent dizziness, light-headedness, or visual disturbances     Notify your health care provider if you experience any of the following:  increased confusion or weakness     Activity as tolerated     Medications:  Reconciled Home Medications:      Medication List        START taking these medications      acetaminophen 500 MG tablet  Commonly known as:  TYLENOL  Take 2 tablets (1,000 mg total) by mouth every 8 (eight) hours.     oxyCODONE 10 mg Tab immediate release tablet  Commonly known as: ROXICODONE  Take 1 tablet (10 mg total) by mouth every 4 (four) hours as needed for Pain.            CHANGE how you take these medications      methocarbamoL 750 MG Tab  Commonly known as: ROBAXIN  Take 1 tablet (750 mg total) by mouth 4 (four) times daily. for 10 days  What changed:   medication strength  how much to take  when to take this  reasons to take this     pregabalin 75 MG capsule  Commonly known as: LYRICA  Take 1 capsule (75 mg total) by mouth 2 (two) times daily.  What changed:   medication strength  how much to take  when to take this            CONTINUE taking these medications      cetirizine 10 MG tablet  Commonly known as: ZYRTEC  Take 10 mg by mouth once daily.     diphenhydrAMINE 25 mg capsule  Commonly known as: BENADRYL  Take 25 mg by mouth every 6 (six) hours as needed for Itching.     LIDOcaine-prilocaine cream  Commonly known as: EMLA  Apply topically as needed (up to 6 times a day).     losartan 50 MG tablet  Commonly known as: COZAAR  Take 0.5 tablets (25 mg total) by mouth once daily.     omeprazole 40 MG capsule  Commonly known as: PRILOSEC  Take 40 mg by mouth 2 (two) times daily before meals.     PROAIR HFA 90 mcg/actuation inhaler  Generic drug: albuterol  Inhale 2 puffs into the lungs every 6 (six) hours as needed for Wheezing. Rescue            STOP taking these medications      ibuprofen 600 MG tablet  Commonly known as: ADVELIZABETH CODYRIN     UNABLE TO FIND     UNABLE TO FIND     UNABLE TO FIND     UNABLE TO FIND            ASK your doctor about these medications      chlorthalidone 25 MG Tab  Commonly known as: HYGROTEN  Take 1 tablet (25 mg total) by mouth once daily.     testosterone 1 % (50 mg/5 gram) Glpk  Commonly known as: ANDROGEL  Apply topically once daily.     zolpidem 10 mg Tab  Commonly known as: AMBIEN  Take 5 mg by mouth nightly as  needed.              Martha Sam PA-C  Neurosurgery  [facility]

## 2024-11-01 NOTE — PT/OT/SLP PROGRESS
Occupational Therapy   Treatment    Name: Orlin Mayo  MRN: [id number]  Admitting Diagnosis:  Thoracic myelopathy  2 Days Post-Op    Recommendations:     Discharge Recommendations: No Therapy Indicated  Discharge Equipment Recommendations:  none  Barriers to discharge:       Assessment:     Orlin Mayo is a 64 y.o. male with a medical diagnosis of Thoracic myelopathy.  He presents with the following performance deficits affecting function: weakness, impaired balance, impaired self care skills, impaired functional mobility, gait instability, decreased lower extremity function, decreased upper extremity function, orthopedic precautions, decreased safety awareness, pain, impaired endurance.     Rehab Prognosis:  Good; patient would benefit from acute skilled OT services to address these deficits and reach maximum level of function.       Plan:     Patient to be seen 4 x/week to address the above listed problems via self-care/home management, therapeutic activities, therapeutic exercises  Plan of Care Expires: 11/30/24  Plan of Care Reviewed with: patient    Subjective     Patient/Family Comments/goals: to improve function  Pain/Comfort:  Pain Rating 1: 7/10  Location - Orientation 1: generalized  Location 1: back  Pain Addressed 1: Reposition, Distraction  Pain Rating Post-Intervention 1: 6/10    Objective:     Communicated with: RN prior to session.  Patient found HOB elevated with hemovac upon OT entry to room.  A client care conference was completed by the OTR and the HAMILTON prior to treatment by the HAMILTON to discuss the patient's POC and current status.    General Precautions: Standard, fall    Orthopedic Precautions:spinal precautions  Braces: TLSO  Respiratory Status: Room air     Occupational Performance:     Bed Mobility:    Patient completed Supine to Sit with supervision  Patient completed Sit to Supine with supervision     Functional Mobility/Transfers:  Patient completed Sit <> Stand Transfer with  supervision  with  no assistive device   Patient completed Bed <> Chair Transfer using Step Transfer technique with stand by assistance with no assistive device and rolling walker  Functional Mobility: pt ambulating community distances with SBA using RW. No LOB or SOB.     Activities of Daily Living:  Upper Body Dressing: stand by assistance to don TLSO brace      [facility] 6 Click ADL: 21    Treatment & Education:  Pt educated on OT POC and frequency during hospital stay.   Pt educated on importance of OOB activity to improve function and activity tolerance.  Pt educated on proper hand placement and techniques for RW mgmt to improve safety awareness.   Addressed all patient questions/concerns within HAMILTON scope of practice.     Patient left up in chair with all lines intact, call button in reach, and RN notified    GOALS:   Multidisciplinary Problems       Occupational Therapy Goals          Problem: Occupational Therapy    Goal Priority Disciplines Outcome Interventions   Occupational Therapy Goal     OT, PT/OT Progressing    Description: Goals to be met by: 11/30/24     Patient will increase functional independence with ADLs by performing:    UE Dressing with Modified [place].  LE Dressing with Modified [place].  Grooming while standing at sink with Modified [place].  Toileting from toilet/bedside commode with Modified [place] for hygiene and clothing management.   Toilet transfer to toilet/bedside commode with Modified [place].                         Time Tracking:     OT Date of Treatment: 11/01/24  OT Start Time: 0926  OT Stop Time: 0945  OT Total Time (min): 19 min    Billable Minutes:Therapeutic Activity 19    OT/MAIRA: MAIRA     Number of MAIRA visits since last OT visit: 1 11/1/2024

## 2024-11-04 NOTE — PLAN OF CARE
[facility]  Discharge Final Note    Primary Care Provider: No, Primary Doctor    Expected Discharge Date: 11/1/2024    Final Discharge Note (most recent)       Final Note - 11/04/24 1430          Final Note    Assessment Type Final Discharge Note     Anticipated Discharge Disposition Home-Health Care [facility]     What phone number can be called within the next 1-3 days to see how you are doing after discharge? --   [phone]       Post-Acute Status    Post-Acute Authorization Home Health     Home Health Status Set-up Complete/Auth obtained                     Important Message from Medicare  Important Message from Medicare regarding Discharge Appeal Rights: Given to patient/caregiver, Explained to patient/caregiver, Signed/date by patient/caregiver     Date IMM was signed: 11/01/24  Time IMM was signed: 1306      Future Appointments   Date Time Provider Department Center   11/13/2024 11:00 AM Susan Romeo RN [facility] [address]   12/12/2024 10:45 AM [facility] Imaging Ctr   12/12/2024 11:30 AM Dawit Carlton MD [facility] [address]     Patient discharged home to care of family and [facility] on 11/1/24.    Raine Spears RNCM  Case Management  [facility]  [phone]

## 2024-11-07 ENCOUNTER — PATIENT MESSAGE (OUTPATIENT)
Dept: NEUROSURGERY | Facility: CLINIC | Age: 64
End: 2024-11-07
Payer: MEDICARE

## 2024-11-07 DIAGNOSIS — M47.14 THORACIC MYELOPATHY: ICD-10-CM

## 2024-11-07 RX ORDER — OXYCODONE HYDROCHLORIDE 10 MG/1
10 TABLET ORAL EVERY 6 HOURS PRN
Qty: 40 TABLET | Refills: 0 | Status: SHIPPED | OUTPATIENT
Start: 2024-11-09

## 2024-11-13 ENCOUNTER — CLINICAL SUPPORT (OUTPATIENT)
Dept: NEUROSURGERY | Facility: CLINIC | Age: 64
End: 2024-11-13
Payer: MEDICARE

## 2024-11-13 VITALS — SYSTOLIC BLOOD PRESSURE: 135 MMHG | DIASTOLIC BLOOD PRESSURE: 70 MMHG | TEMPERATURE: 98 F | HEART RATE: 84 BPM

## 2024-11-13 DIAGNOSIS — Z98.1 S/P LAMINECTOMY WITH SPINAL FUSION: Primary | ICD-10-CM

## 2024-11-13 PROCEDURE — 99999 PR PBB SHADOW E&M-EST. PATIENT-LVL III: CPT | Mod: PBBFAC,,,

## 2024-11-13 PROCEDURE — 99213 OFFICE O/P EST LOW 20 MIN: CPT | Mod: PBBFAC

## 2024-11-13 RX ORDER — METHOCARBAMOL 500 MG/1
500 TABLET, FILM COATED ORAL 4 TIMES DAILY
Qty: 40 TABLET | Refills: 0 | Status: SHIPPED | OUTPATIENT
Start: 2024-11-13 | End: 2024-11-23

## 2024-11-13 NOTE — PROGRESS NOTES
Orlin Mayo is a 64 y.o. male here for 2 week post op wound check here on 8th floor neurosurgery clinic    Surgery:T8-T11 lami fusion    Symptoms: some pain in back and feet    DME:cane    Brace:lso    Pain: 4    Medication Refills: yes, methocarbomol       Incision with sutures and  staples, MAIRA, well approximated, no redness, or purulent drainage. Some swelling noted to back area.  Pt. Denies any headaches. States he has been doing housework/vaccuming making bed etc.  Back assessed by Bettina Michel NP    Sutures and  staples removed. Pt tolerated procedure well.    EDUCATION:    Instructed patient to keep incision MAIRA, no lotions, creams or bandages. OK to shower without water pressure to incision. No scrubbing. Pat dry.  No submurging incision in water (no bathing/swimming) until 8 weeks after surgery once wound is healed. Do not lift more than 10 pounds. Avoid bending or twisting in the area of your surgery more than 45 degrees from neutral position in any direction. Wear brace at all times when up out of bed except when showering or flat in bed. Increase ambulation as tolerated. You should be walking 2 miles/day. Walk on paved surfaces only, holding side rail when using stairs.  No sexual activity for 6 weeks after surgery.  No driving or operating heavy machinary : until cleared by surgeon  or while you are taking narcotic pain medication or muscle relaxant.  If you have had a fusion, do not take any non steroidal anti-inflammatory drugs (NSAIDS) Including the following: Ibuprofen, naprosyn, Aleve, Advil, Indocin, Mobic, or Celebrex for 6 weeks.  Take docusate (colace 100mg): take 1 capsule a day as needed for constipation. You can purchase over the counter.  Avoid use of tobacco products.    Call your doctor or go to the Emergency Room for any signs of infection including: increased redness, drainage, pain or fever (temperature of 101.5 or above for 24hrs). Call your doctor or go to the ER if there are  any localized neurological changes, problems with speech, vision,numbness,tingling,weakness,severe headache or other concerns.  Above instructions reviewed with pt and copy of instructions given to pt along with follow up appt date and time.    Physicians need 3 days' notice for pain medicine to be refilled. Pain medicine will only be refilled between 8am and 5pm Monday-Friday.    Patient verbalizes understanding. All questions answered. Pt. Encouraged to call or send message thru the portal for any additional concerns. Patient to followup with Dr. Carlton for 6 week followup with imaging.

## 2024-11-18 ENCOUNTER — OFFICE VISIT (OUTPATIENT)
Dept: PODIATRY | Facility: CLINIC | Age: 64
End: 2024-11-18
Payer: MEDICARE

## 2024-11-18 VITALS — BODY MASS INDEX: 24.22 KG/M2 | HEIGHT: 68 IN | WEIGHT: 159.81 LBS

## 2024-11-18 DIAGNOSIS — M21.372 FOOT DROP, BILATERAL: ICD-10-CM

## 2024-11-18 DIAGNOSIS — M21.371 FOOT DROP, BILATERAL: ICD-10-CM

## 2024-11-18 DIAGNOSIS — M79.2 NERVE PAIN: Primary | ICD-10-CM

## 2024-11-18 PROCEDURE — 99213 OFFICE O/P EST LOW 20 MIN: CPT | Mod: S$PBB,,, | Performed by: STUDENT IN AN ORGANIZED HEALTH CARE EDUCATION/TRAINING PROGRAM

## 2024-11-18 PROCEDURE — 99213 OFFICE O/P EST LOW 20 MIN: CPT | Mod: PBBFAC,PO | Performed by: STUDENT IN AN ORGANIZED HEALTH CARE EDUCATION/TRAINING PROGRAM

## 2024-11-18 PROCEDURE — 99999 PR PBB SHADOW E&M-EST. PATIENT-LVL III: CPT | Mod: PBBFAC,,, | Performed by: STUDENT IN AN ORGANIZED HEALTH CARE EDUCATION/TRAINING PROGRAM

## 2024-11-18 RX ORDER — VIT B12/LEVOMEFOLATE/VIT B6/B2 1-6-50-5MG
TABLET ORAL
COMMUNITY

## 2024-11-18 NOTE — PROGRESS NOTES
Subjective:      Patient ID: Orlin Mayo is a 64 y.o. male.    Chief Complaint: Foot Pain and Peripheral Neuropathy (Neuropathy bilateral metafolbic not seeming to help.)    Mr. Ordaz presents today with two complaints. He has b/l peripheral neuropathy related to his lumbar or thoracic spine. He has close follow up with neurosurgery for this. He is currently taking nervive but nothing appears to help. Because of this neuropathy, he has hypersensitivity and cannot wear shoes for long periods and walking barefoot has led to pain at the left ball of the foot for about the last 7-10 days. Pain resolves when he wears a shoe.    11/18/2024  He's been taking metanx and had recent NS of the back s/p 10/31/24. No improvement of the legs.     Review of Systems   Neurological:  Positive for paresthesias and sensory change.   All other systems reviewed and are negative.          Objective:      Physical Exam  Cardiovascular:      Pulses:           Dorsalis pedis pulses are 2+ on the right side and 2+ on the left side.        Posterior tibial pulses are 2+ on the right side and 2+ on the left side.   Feet:      Comments: (-) Tinel's sign  Light touch intact.  MMT 2/5 DF  Normal muscle tone.  No signs of atrophy.  No tremor or spasticity.    Pain at the L 2nd MTPJ              Assessment:       Encounter Diagnoses   Name Primary?    Nerve pain Yes    Foot drop, bilateral            Plan:       Orlin was seen today for foot pain and peripheral neuropathy.    Diagnoses and all orders for this visit:    Nerve pain    Foot drop, bilateral        I counseled the patient on his conditions, their implications and medical management.    Cont Metanx as needed.    Recommend allowing 2-3 more months to see if the recent back surgery will help. Bring [facility]CV [facility] from [facility].    F/u with Dr. Bernal in 2-3 months for possible diagnostic nerve injections.    Sal Sears, SANJUANA

## 2024-11-19 ENCOUNTER — PATIENT MESSAGE (OUTPATIENT)
Dept: NEUROSURGERY | Facility: CLINIC | Age: 64
End: 2024-11-19
Payer: MEDICARE

## 2024-11-19 DIAGNOSIS — M47.14 THORACIC MYELOPATHY: ICD-10-CM

## 2024-11-19 RX ORDER — OXYCODONE HYDROCHLORIDE 10 MG/1
10 TABLET ORAL EVERY 8 HOURS PRN
Qty: 30 TABLET | Refills: 0 | Status: SHIPPED | OUTPATIENT
Start: 2024-11-20 | End: 2024-11-20 | Stop reason: SDUPTHER

## 2024-11-20 ENCOUNTER — PATIENT MESSAGE (OUTPATIENT)
Dept: NEUROSURGERY | Facility: CLINIC | Age: 64
End: 2024-11-20
Payer: MEDICARE

## 2024-11-20 DIAGNOSIS — M47.14 THORACIC MYELOPATHY: ICD-10-CM

## 2024-11-20 RX ORDER — OXYCODONE HYDROCHLORIDE 10 MG/1
10 TABLET ORAL EVERY 8 HOURS PRN
Qty: 30 TABLET | Refills: 0 | Status: SHIPPED | OUTPATIENT
Start: 2024-11-20

## 2024-11-22 ENCOUNTER — PATIENT MESSAGE (OUTPATIENT)
Dept: PODIATRY | Facility: CLINIC | Age: 64
End: 2024-11-22
Payer: MEDICARE

## 2024-12-12 ENCOUNTER — OFFICE VISIT (OUTPATIENT)
Dept: NEUROSURGERY | Facility: CLINIC | Age: 64
End: 2024-12-12
Payer: MEDICARE

## 2024-12-12 ENCOUNTER — HOSPITAL ENCOUNTER (OUTPATIENT)
Dept: RADIOLOGY | Facility: HOSPITAL | Age: 64
Discharge: HOME | End: 2024-12-12
Attending: NEUROLOGICAL SURGERY
Payer: MEDICARE

## 2024-12-12 VITALS — SYSTOLIC BLOOD PRESSURE: 133 MMHG | HEART RATE: 82 BPM | TEMPERATURE: 98 F | DIASTOLIC BLOOD PRESSURE: 83 MMHG

## 2024-12-12 DIAGNOSIS — Z98.1 S/P LAMINECTOMY WITH SPINAL FUSION: ICD-10-CM

## 2024-12-12 DIAGNOSIS — M54.6 PAIN IN THORACIC SPINE: ICD-10-CM

## 2024-12-12 DIAGNOSIS — Z98.1 S/P SPINAL FUSION: ICD-10-CM

## 2024-12-12 DIAGNOSIS — M54.6 PAIN IN THORACIC SPINE: Primary | ICD-10-CM

## 2024-12-12 PROCEDURE — 72070 X-RAY EXAM THORAC SPINE 2VWS: CPT | Mod: TC

## 2024-12-12 PROCEDURE — 99214 OFFICE O/P EST MOD 30 MIN: CPT | Mod: PBBFAC,25 | Performed by: NURSE PRACTITIONER

## 2024-12-12 PROCEDURE — 72070 X-RAY EXAM THORAC SPINE 2VWS: CPT | Mod: 26,,, | Performed by: RADIOLOGY

## 2024-12-12 PROCEDURE — 99999 PR PBB SHADOW E&M-EST. PATIENT-LVL IV: CPT | Mod: PBBFAC,,, | Performed by: NURSE PRACTITIONER

## 2024-12-12 PROCEDURE — 99024 POSTOP FOLLOW-UP VISIT: CPT | Mod: POP,,, | Performed by: NURSE PRACTITIONER

## 2024-12-12 RX ORDER — PREGABALIN 75 MG/1
75 CAPSULE ORAL 2 TIMES DAILY
Qty: 180 CAPSULE | Refills: 0 | Status: SHIPPED | OUTPATIENT
Start: 2024-12-12 | End: 2024-12-12 | Stop reason: SDUPTHER

## 2024-12-12 NOTE — PROGRESS NOTES
CHIEF COMPLAINT:  Post op    I, Candice Woods, attest that this documentation has been prepared under the direction and in the presence of Dawit Carlton MD.    HPI from 10/8/2024:  Mr. Orlin Mayo is a 63 y.o. male with severe multilevel degenerative disc disease throughout his entire spine to include cervical thoracic and lumbar. He has a degenerative scoliosis. I would agree with his spine surgeon in [place] that multilevel thoracic instrumentation and fusion would be very reasonable for his thoracic myelopathy. Given the degree of time that his symptoms are then present also agree that it may or may not improve his symptoms but the goal would be cessation of the progression. My only concern and it seems that this was discussed with him by his surgeon is what happens to the degenerative disc between his thoracic construct and his lumbar construct given his degenerative scoliosis. I explained that I am not a deformity surgeon but this may degenerate over the course of time requiring a larger deformity surgeries. He states he has family in [place] and has had other surgeries there and I believe this recommendation to be very reasonable.     Interval Hx 12/12/24:   Today the patient presents with myself and Dr. Carlton for 6 week post op s/p T8-11 laminectomy and posterior instrumented fusion on 10/30/2024. The patient reports that he has been feeling okay since surgery. He has occasional numbness in his right arm and hand, but has not happened lately. Since his surgery, the numbness in his right foot has migrated upwards into his lower leg. He has lost some function in his left foot, but he reports he's also in less pain. He says the pain in his feet gets worse as the day goes on. He denies any bowel/bladder changes since surgery. He has been compliant with the TLSO brace.     Review of patient's allergies indicates:   Allergen Reactions    Animal dander     Penicillins     Pollen extracts        Past  Medical History:   Diagnosis Date    Acute asthma exacerbation 02/05/2021    Anemia     Anxiety     Asthma     Cervical spondylosis 10/14/2016    Chronic back pain     COPD (chronic obstructive pulmonary disease)     Depression     GERD (gastroesophageal reflux disease)     History of prosthetic unicompartmental arthroplasty of right knee 02/24/2020    Hyperlipidemia     Hypertension 08/15/2016    Major depressive disorder, recurrent episode, mild 02/07/2008    Mild intermittent asthma, uncomplicated 07/19/2018     Past Surgical History:   Procedure Laterality Date    ARTHROSCOPIC REPAIR OF ROTATOR CUFF OF SHOULDER Right 08/01/2019    COLONOSCOPY      HERNIA REPAIR      JOINT REPLACEMENT      left shoulder surgery      LUMBAR FUSION      x2    REPAIR OF TRICEPS TENDON Right     3 surgeries    REPAIR OF TRICEPS TENDON Left     x1    right knee tkr Right     ROTATOR CUFF REPAIR Bilateral     2 on right , 1 to left    THORACIC LAMINECTOMY WITH FUSION N/A 10/30/2024    Procedure: T8-T11 Laminectomy and posterior instrumented fusion;  Surgeon: Dawit Carlton MD;  Location: [facility] OR [address] [place] [address];  Service: Neurosurgery;  Laterality: N/A;  T8-T11 laminectomy and posterior instrumented fusion  anesthesia: general  brace: TLSO  radiology: C-arm  neuro mon: EMG/SEP/MEP  position: prone  bed: [place] [id number] post  headrest: [facility] with hanging wts  misc: aqujing, p    UNICOMPARTMENTAL ARTHROPLASTY OF KNEE Right      Family History   Problem Relation Name Age of Onset    Hypertension Mother      Cancer Mother      Cancer Father      Hypertension Father       Social History     Tobacco Use    Smoking status: Never    Smokeless tobacco: Never   Substance Use Topics    Alcohol use: Yes     Comment: Has 2 drinks 2-3 each night wine bourbon and teisted tea    Drug use: Yes     Types: Marijuana     Comment: twice per week; uses edibles and vap pen        Review of Systems   Neurological:  Positive for weakness and numbness.    All other systems reviewed and are negative.      OBJECTIVE:   Vital Signs:       Physical Exam:  General: well developed, well nourished, no distress.   Head: normocephalic, atraumatic  Neurologic: Alert and oriented. Thought content appropriate.  GCS: Motor: 6/Verbal: 5/Eyes: 4 GCS Total: 15  Mental Status: Awake, Alert, Oriented x 4  Language: No aphasia  Speech: No dysarthria  Cranial nerves: face symmetric, tongue midline, CN II-XII grossly intact.   Eyes: pupils equal, round, reactive to light with accomodation, EOMI.   Pulmonary: normal respirations, no signs of respiratory distress  Abdomen: soft, non-distended  Skin: Skin is warm, dry and intact. Incision well-healed  Sensory: intact to light touch throughout  Motor Strength:Moves all extremities spontaneously with good tone.       Diagnostic Results:  All imaging was independently reviewed by myself and Dr. Carlton:    X-ray thoracic spine dated 12/12/24 shows T8-T11 PSF. Hardware is intact.     ASSESSMENT/PLAN:     Orlin Mayo s/p T8-11 laminectomy and posterior instrumented fusion on 10/30/2024 with Dr. Carlton. He was seen in clinic today for the 6 week post-op evaluation and is doing well overall. I have changed his Lyrica prescription. A CT thoracic spine has been ordered to obtain in 6 weeks to assess for bony fusion. I would like the patient to follow-up with Dr. Carlton for the 3 month post-op evaluation. I have encouraged him to contact the clinic with any questions, concerns, or adverse clinical changes. He verbalized understanding.      SHRAVAN Mcguire-DEBBIE  Neurosurgery  [facility] [place].

## 2024-12-16 RX ORDER — PREGABALIN 75 MG/1
75 CAPSULE ORAL 2 TIMES DAILY
Qty: 180 CAPSULE | Refills: 0 | Status: SHIPPED | OUTPATIENT
Start: 2024-12-16 | End: 2025-03-16

## 2024-12-24 ENCOUNTER — EXTERNAL HOME HEALTH (OUTPATIENT)
Dept: HOME HEALTH SERVICES | Facility: HOSPITAL | Age: 64
End: 2024-12-24
Payer: MEDICARE

## 2024-12-30 ENCOUNTER — TELEPHONE (OUTPATIENT)
Dept: ORTHOPEDICS | Facility: CLINIC | Age: 64
End: 2024-12-30
Payer: MEDICARE

## 2025-02-25 DIAGNOSIS — M54.6 PAIN IN THORACIC SPINE: ICD-10-CM

## 2025-02-25 DIAGNOSIS — Z98.1 S/P SPINAL FUSION: ICD-10-CM

## 2025-02-25 RX ORDER — PREGABALIN 75 MG/1
75 CAPSULE ORAL 2 TIMES DAILY
Qty: 180 CAPSULE | Refills: 0 | Status: SHIPPED | OUTPATIENT
Start: 2025-02-25 | End: 2025-05-26

## 2025-03-05 ENCOUNTER — TELEPHONE (OUTPATIENT)
Dept: NEUROSURGERY | Facility: CLINIC | Age: 65
End: 2025-03-05
Payer: MEDICARE

## 2025-03-07 DIAGNOSIS — M54.6 PAIN IN THORACIC SPINE: ICD-10-CM

## 2025-03-07 DIAGNOSIS — Z98.1 S/P SPINAL FUSION: ICD-10-CM

## 2025-03-10 ENCOUNTER — PATIENT MESSAGE (OUTPATIENT)
Dept: NEUROSURGERY | Facility: CLINIC | Age: 65
End: 2025-03-10
Payer: MEDICARE

## 2025-03-10 RX ORDER — PREGABALIN 75 MG/1
75 CAPSULE ORAL 2 TIMES DAILY
Qty: 180 CAPSULE | Refills: 0 | Status: SHIPPED | OUTPATIENT
Start: 2025-03-10 | End: 2025-06-08

## 2025-05-01 ENCOUNTER — OFFICE VISIT (OUTPATIENT)
Dept: NEUROSURGERY | Facility: CLINIC | Age: 65
End: 2025-05-01
Payer: MEDICARE

## 2025-05-01 ENCOUNTER — HOSPITAL ENCOUNTER (OUTPATIENT)
Dept: RADIOLOGY | Facility: HOSPITAL | Age: 65
Discharge: HOME | End: 2025-05-01
Attending: NURSE PRACTITIONER
Payer: MEDICARE

## 2025-05-01 VITALS — DIASTOLIC BLOOD PRESSURE: 73 MMHG | HEART RATE: 74 BPM | SYSTOLIC BLOOD PRESSURE: 118 MMHG

## 2025-05-01 DIAGNOSIS — L97.501 ULCER OF FOOT, LIMITED TO BREAKDOWN OF SKIN, UNSPECIFIED LATERALITY: Primary | ICD-10-CM

## 2025-05-01 PROCEDURE — 99213 OFFICE O/P EST LOW 20 MIN: CPT | Mod: S$PBB,,, | Performed by: NEUROLOGICAL SURGERY

## 2025-05-01 PROCEDURE — 99213 OFFICE O/P EST LOW 20 MIN: CPT | Mod: PBBFAC,25 | Performed by: NEUROLOGICAL SURGERY

## 2025-05-01 PROCEDURE — 72128 CT CHEST SPINE W/O DYE: CPT | Mod: 26,,, | Performed by: RADIOLOGY

## 2025-05-01 PROCEDURE — 72128 CT CHEST SPINE W/O DYE: CPT | Mod: TC

## 2025-05-01 PROCEDURE — 99999 PR PBB SHADOW E&M-EST. PATIENT-LVL III: CPT | Mod: PBBFAC,,, | Performed by: NEUROLOGICAL SURGERY

## 2025-05-01 NOTE — PROGRESS NOTES
"CHIEF COMPLAINT:  7 month post operative visit s/p T8-T11 Laminectomy and posterior instrumented fusion     I, Russel Bebo, attest that this documentation has been prepared under the direction and in the presence of Dawit Carlton MD.    Interval Hx 05/01/2025:  Today, the patient presents for a postoperative visit 7 months s/p T8-11 laminectomy and posterior instrumented fusion on 10/30/2024. The patient reports that in the last 4 weeks, he endorses sharp "zinging" pains in the thoracic area, few inches left of the spine. Patient reports that his symptoms are relatively the same at the moment. Patient also reports some pain in the toes, citing that it looks black. Patient denies being diabetic. Patient takes Lyrica as prescribed.     Interval Hx 12/12/24, per Bettina Michel, NP's not:   Today the patient presents with myself and Dr. Carlton for 6 week post op s/p T8-11 laminectomy and posterior instrumented fusion on 10/30/2024. The patient reports that he has been feeling okay since surgery. He has occasional numbness in his right arm and hand, but has not happened lately. Since his surgery, the numbness in his right foot has migrated upwards into his lower leg. He has lost some function in his left foot, but he reports he's also in less pain. He says the pain in his feet gets worse as the day goes on. He denies any bowel/bladder changes since surgery. He has been compliant with the TLSO brace.      HPI from 10/8/2024, per Bettina Michel, NP's note  :  Mr. Orlin Mayo is a 63 y.o. male with severe multilevel degenerative disc disease throughout his entire spine to include cervical thoracic and lumbar. He has a degenerative scoliosis. I would agree with his spine surgeon in [place] that multilevel thoracic instrumentation and fusion would be very reasonable for his thoracic myelopathy. Given the degree of time that his symptoms are then present also agree that it may or may not improve his symptoms " but the goal would be cessation of the progression. My only concern and it seems that this was discussed with him by his surgeon is what happens to the degenerative disc between his thoracic construct and his lumbar construct given his degenerative scoliosis. I explained that I am not a deformity surgeon but this may degenerate over the course of time requiring a larger deformity surgeries. He states he has family in [place] and has had other surgeries there and I believe this recommendation to be very reasonable.      Review of patient's allergies indicates:   Allergen Reactions    Animal dander     Penicillins     Pollen extracts        Past Medical History:   Diagnosis Date    Acute asthma exacerbation 02/05/2021    Anemia     Anxiety     Asthma     Cervical spondylosis 10/14/2016    Chronic back pain     COPD (chronic obstructive pulmonary disease)     Depression     GERD (gastroesophageal reflux disease)     History of prosthetic unicompartmental arthroplasty of right knee 02/24/2020    Hyperlipidemia     Hypertension 08/15/2016    Major depressive disorder, recurrent episode, mild 02/07/2008    Mild intermittent asthma, uncomplicated 07/19/2018     Past Surgical History:   Procedure Laterality Date    ARTHROSCOPIC REPAIR OF ROTATOR CUFF OF SHOULDER Right 08/01/2019    COLONOSCOPY      HERNIA REPAIR      JOINT REPLACEMENT      left shoulder surgery      LUMBAR FUSION      x2    REPAIR OF TRICEPS TENDON Right     3 surgeries    REPAIR OF TRICEPS TENDON Left     x1    right knee tkr Right     ROTATOR CUFF REPAIR Bilateral     2 on right , 1 to left    THORACIC LAMINECTOMY WITH FUSION N/A 10/30/2024    Procedure: T8-T11 Laminectomy and posterior instrumented fusion;  Surgeon: Dawit Carlton MD;  Location: [facility] OR [address] [place] [address];  Service: Neurosurgery;  Laterality: N/A;  T8-T11 laminectomy and posterior instrumented fusion  anesthesia: general  brace: TLSO  radiology: C-arm  neuro mon: EMG/SEP/MEP  position:  prone  bed: Potter 4 post  headrest: carito osman with hanging wts  misc: stacey oro    UNICOMPARTMENTAL ARTHROPLASTY OF KNEE Right      Family History   Problem Relation Name Age of Onset    Hypertension Mother      Cancer Mother      Cancer Father      Hypertension Father       Social History[1]     Review of Systems   Constitutional: Negative.    HENT: Negative.     Eyes: Negative.    Respiratory: Negative.     Cardiovascular: Negative.    Gastrointestinal: Negative.    Endocrine: Negative.    Genitourinary: Negative.    Musculoskeletal:  Positive for back pain (sharp pain on excessive twisting in thoracic area). Negative for gait problem and neck pain.   Skin: Negative.    Allergic/Immunologic: Negative.    Neurological:  Positive for numbness (toes). Negative for weakness, light-headedness and headaches.   Hematological: Negative.    Psychiatric/Behavioral: Negative.         OBJECTIVE:   Vital Signs:       Physical Exam:    Vital signs: All nursing notes and vital signs reviewed -- afebrile, vital signs stable.  Constitutional: Patient sitting comfortably in chair. Appears well developed and well nourished.  Skin: Exposed areas are intact without abnormal markings, rashes or other lesions.  HEENT: Normocephalic. Normal conjunctivae.  Cardiovascular: Normal rate and regular rhythm.  Respiratory: Chest wall rises and falls symmetrically, without signs of respiratory distress.  Abdomen: Soft and non-tender.  Extremities: Warm and without edema. Calves supple, non-tender.  Psych/Behavior: Normal affect.    Neurological:    Mental status: Alert and oriented. Conversational and appropriate.       Cranial Nerves: VFF to confrontation. PERRL. EOMI without nystagmus. Facial STLT normal and symmetric. Strong, symmetric muscles of mastication. Facial strength full and symmetric. Hearing equal bilaterally to finger rub. Palate and uvula rise and fall normally in midline. Shoulder shrug 5/5 strength. Tongue  midline.     Motor:    Upper:  Deltoids Triceps Biceps WE WF     R 5/5 5/5 5/5 5/5 5/5 5/5    L 5/5 5/5 5/5 5/5 5/5 5/5      Lower:  HF KE KF DF PF EHL    R 5/5 5/5 5/5 5/5 5/5 5/5    L 5/5 5/5 5/5 5/5 5/5 5/5     Sensory: Intact sensation to light touch in all extremities. Romberg negative.    Reflexes:          DTR: 2+ symmetrically throughout.     Johnson's: Negative.     Babinski's: Negative.     Clonus: Negative.    Cerebellar: Finger-to-nose and rapid alternating movements normal. Gait stable, fluid.    Spine:    Posture: Head well aligned over pelvis in front and side views.  No focal or global spinal deformity visible on inspection. Shoulders and hips even. No obvious leg length discrepancy. No scapula winging.    Bending: Full ROM with forward, back and lateral bending. No rib prominence with forward bend.    Cervical:      ROM: Full with flexion, extension, lateral rotation and ear-to-shoulder bend.      Midline TTP: Negative.     Spurling's test: Negative.     Lhermitte's: Negative.    Thoracic:     Midline TTP: Negative    Lumbar:     Midline TTP: Negative     Straight Leg Test: Negative     Crossed Straight Leg Test: Negative     Sciatic notch tenderness: Negative.    Other:     SI joint TTP: Negative.     Greater trochanter TTP: Negative.     Tenderness with external/internal hip rotation: Negative.    Diagnostic Results:  All imaging was independently reviewed by me.    CT Thoracic Spine WO IV Contrast, dated 05/01/2025:  Good hardware position  Good alignment  Evidence of fusion    ASSESSMENT/PLAN:     Orlin Mayo is a 64 y.o. male with thoracic myelopathy in setting of T8-T11 stenosis and prior L2-S1 fusion and proximal junctional scoliosis who presents for 7 month post operative visit s/p T8-T11 Laminectomy and posterior instrumented fusion on 10/30/24. Given the imaging indicative of good hardware position, good alignment, and evidence of fusion, it is recommend that the patient follow  up to clinic at 1 year postop. His bilateral neuropathy is stable though he does have some new ulcerations on both big toes for which I will refer to Wound Care. His thoracic CT looks good with good hardware position and evidence of early fusion. I    Recommend:  1. Refer to wound care for toe.  2. RTC 1 yr from surgery (5 mo from now)  The patient understands and agrees with the plan of care. All questions were answered.     I, Dr. Dawit Carlton personally performed the services described in this documentation. All medical record entries made by the scribeRussel were at my direction and in my presence. I have reviewed the chart and agree that the record reflects my personal performance and is accurate and complete.      Dawit Carlton M.D.  Department of Neurosurgery  [facility]         [1]   Social History  Tobacco Use    Smoking status: Never    Smokeless tobacco: Never   Substance Use Topics    Alcohol use: Yes     Comment: Has 2 drinks 2-3 each night wine bourbon and teisted tea    Drug use: Yes     Types: Marijuana     Comment: twice per week; uses edibles and vap pen

## 2025-06-18 DIAGNOSIS — M54.6 PAIN IN THORACIC SPINE: ICD-10-CM

## 2025-06-18 DIAGNOSIS — Z98.1 S/P SPINAL FUSION: ICD-10-CM

## 2025-06-18 RX ORDER — PREGABALIN 75 MG/1
75 CAPSULE ORAL 2 TIMES DAILY
Qty: 180 CAPSULE | Refills: 0 | Status: SHIPPED | OUTPATIENT
Start: 2025-06-18

## 2025-07-08 ENCOUNTER — PATIENT MESSAGE (OUTPATIENT)
Dept: NEUROSURGERY | Facility: CLINIC | Age: 65
End: 2025-07-08
Payer: MEDICARE

## 2025-07-08 ENCOUNTER — TELEPHONE (OUTPATIENT)
Dept: NEUROSURGERY | Facility: CLINIC | Age: 65
End: 2025-07-08
Payer: MEDICARE

## 2025-07-08 DIAGNOSIS — G60.9 IDIOPATHIC PERIPHERAL NEUROPATHY: Primary | ICD-10-CM

## 2025-07-16 DIAGNOSIS — G60.9 IDIOPATHIC PERIPHERAL NEUROPATHY: Primary | ICD-10-CM

## 2025-07-16 DIAGNOSIS — Z98.1 S/P SPINAL FUSION: ICD-10-CM

## 2025-07-16 DIAGNOSIS — M54.6 PAIN IN THORACIC SPINE: ICD-10-CM

## (undated) DEVICE — DRESSING AQUACEL SACRAL 9 X 9

## (undated) DEVICE — SUT STRATAFIX PDS PLUS VIO

## (undated) DEVICE — SUT VICRYL+ 1 CT1 18IN

## (undated) DEVICE — TIP YANKAUERS BULB NO VENT

## (undated) DEVICE — TAP 5MM SPINAL POWER

## (undated) DEVICE — DRAPE STERI INSTRUMENT 1018

## (undated) DEVICE — DRESSING AQUACEL FOAM 5 X 5

## (undated) DEVICE — SUT CTD VICRYL 2-0 CR/CT-2

## (undated) DEVICE — CORD BIPOLAR 12 FOOT

## (undated) DEVICE — SUT SILK 3-0 BLK BR SH 30IN

## (undated) DEVICE — SPONGE LAP 4X18 PREWASHED

## (undated) DEVICE — DRAPE STERI-DRAPE 1000 17X11IN

## (undated) DEVICE — DRESSING MEPILEX BORDER 4 X 4

## (undated) DEVICE — APPLICATOR CHLORAPREP ORN 26ML

## (undated) DEVICE — MARKER SKIN RULER STERILE

## (undated) DEVICE — TRAY CATH 1-LYR URIMTR 16FR

## (undated) DEVICE — SPONGE NEURO 1/4X1/4

## (undated) DEVICE — TUBE FRAZIER 5MM 2FT SOFT TIP

## (undated) DEVICE — DRESSING AQUACEL AG ADV 3.5X6

## (undated) DEVICE — SPONGE COTTON TRAY 4X4IN

## (undated) DEVICE — NDL SPINAL 18GX3.5 SPINOCAN

## (undated) DEVICE — CLIP MED TICALL

## (undated) DEVICE — DRESSING AQUACEL FOAM 4 X 12

## (undated) DEVICE — BUR BONE CUT MICRO TPS 3X3.8MM

## (undated) DEVICE — PENCIL ROCKER SWITCH 10FT CORD

## (undated) DEVICE — BLADE MILL+ BONE MEDIUM DISP

## (undated) DEVICE — DRAPE C-ARMOR EQUIPMENT COVER

## (undated) DEVICE — ELECTRODE REM PLYHSV RETURN 9

## (undated) DEVICE — KIT EVACUATOR 3-SPRING 1/8 DRN

## (undated) DEVICE — DRAPE C-ARM ELAS CLIP 42X120IN

## (undated) DEVICE — SPHERE MARKER REFLECTIVE DISP

## (undated) DEVICE — TRAY NEURO OMC

## (undated) DEVICE — DRAPE TOP 53X102IN

## (undated) DEVICE — Device

## (undated) DEVICE — DRESSING AQUACEL FOAM 3 X 3

## (undated) DEVICE — DRAPE ABDOMINAL TIBURON 14X11

## (undated) DEVICE — BLADE 4IN EDGE INSULATED

## (undated) DEVICE — SPONGE GAUZE 16PLY 4X4

## (undated) DEVICE — KIT SURGIFLO HEMOSTATIC MATRIX

## (undated) DEVICE — SYR IRRIGATION BULB STER 60ML

## (undated) DEVICE — COVER BACK TBL HD 2-TIER 72IN